# Patient Record
Sex: MALE | Race: WHITE | NOT HISPANIC OR LATINO | Employment: FULL TIME | URBAN - METROPOLITAN AREA
[De-identification: names, ages, dates, MRNs, and addresses within clinical notes are randomized per-mention and may not be internally consistent; named-entity substitution may affect disease eponyms.]

---

## 2017-05-08 ENCOUNTER — ALLSCRIPTS OFFICE VISIT (OUTPATIENT)
Dept: OTHER | Facility: OTHER | Age: 27
End: 2017-05-08

## 2017-05-08 ENCOUNTER — LAB REQUISITION (OUTPATIENT)
Dept: LAB | Facility: HOSPITAL | Age: 27
End: 2017-05-08
Payer: COMMERCIAL

## 2017-05-08 DIAGNOSIS — Z00.00 ENCOUNTER FOR GENERAL ADULT MEDICAL EXAMINATION WITHOUT ABNORMAL FINDINGS: ICD-10-CM

## 2017-05-08 DIAGNOSIS — Z13.1 ENCOUNTER FOR SCREENING FOR DIABETES MELLITUS: ICD-10-CM

## 2017-05-08 DIAGNOSIS — Z78.9 OTHER SPECIFIED HEALTH STATUS: ICD-10-CM

## 2017-05-08 DIAGNOSIS — Z13.0 ENCOUNTER FOR SCREENING FOR DISEASES OF THE BLOOD AND BLOOD-FORMING ORGANS AND CERTAIN DISORDERS INVOLVING THE IMMUNE MECHANISM: ICD-10-CM

## 2017-05-08 DIAGNOSIS — R79.1 ABNORMAL COAGULATION PROFILE: ICD-10-CM

## 2017-05-08 DIAGNOSIS — R17 JAUNDICE: ICD-10-CM

## 2017-05-08 LAB
ALBUMIN SERPL BCP-MCNC: 4.4 G/DL (ref 3.5–5)
ALP SERPL-CCNC: 63 U/L (ref 46–116)
ALT SERPL W P-5'-P-CCNC: 24 U/L (ref 12–78)
ANION GAP SERPL CALCULATED.3IONS-SCNC: 7 MMOL/L (ref 4–13)
AST SERPL W P-5'-P-CCNC: 15 U/L (ref 5–45)
BASOPHILS # BLD AUTO: 0.03 THOUSANDS/ΜL (ref 0–0.1)
BASOPHILS NFR BLD AUTO: 1 % (ref 0–1)
BILIRUB SERPL-MCNC: 2.21 MG/DL (ref 0.2–1)
BUN SERPL-MCNC: 13 MG/DL (ref 5–25)
CALCIUM SERPL-MCNC: 9.5 MG/DL (ref 8.3–10.1)
CHLORIDE SERPL-SCNC: 105 MMOL/L (ref 100–108)
CO2 SERPL-SCNC: 28 MMOL/L (ref 21–32)
CREAT SERPL-MCNC: 0.84 MG/DL (ref 0.6–1.3)
EOSINOPHIL # BLD AUTO: 0.15 THOUSAND/ΜL (ref 0–0.61)
EOSINOPHIL NFR BLD AUTO: 3 % (ref 0–6)
ERYTHROCYTE [DISTWIDTH] IN BLOOD BY AUTOMATED COUNT: 13.1 % (ref 11.6–15.1)
GFR SERPL CREATININE-BSD FRML MDRD: >60 ML/MIN/1.73SQ M
GLUCOSE P FAST SERPL-MCNC: 84 MG/DL (ref 65–99)
HCT VFR BLD AUTO: 45.3 % (ref 36.5–49.3)
HGB BLD-MCNC: 15.5 G/DL (ref 12–17)
LYMPHOCYTES # BLD AUTO: 2.36 THOUSANDS/ΜL (ref 0.6–4.47)
LYMPHOCYTES NFR BLD AUTO: 44 % (ref 14–44)
MCH RBC QN AUTO: 31.2 PG (ref 26.8–34.3)
MCHC RBC AUTO-ENTMCNC: 34.2 G/DL (ref 31.4–37.4)
MCV RBC AUTO: 91 FL (ref 82–98)
MONOCYTES # BLD AUTO: 0.38 THOUSAND/ΜL (ref 0.17–1.22)
MONOCYTES NFR BLD AUTO: 7 % (ref 4–12)
NEUTROPHILS # BLD AUTO: 2.37 THOUSANDS/ΜL (ref 1.85–7.62)
NEUTS SEG NFR BLD AUTO: 45 % (ref 43–75)
NRBC BLD AUTO-RTO: 0 /100 WBCS
PLATELET # BLD AUTO: 234 THOUSANDS/UL (ref 149–390)
PMV BLD AUTO: 11.8 FL (ref 8.9–12.7)
POTASSIUM SERPL-SCNC: 5.1 MMOL/L (ref 3.5–5.3)
PROT SERPL-MCNC: 7.9 G/DL (ref 6.4–8.2)
RBC # BLD AUTO: 4.97 MILLION/UL (ref 3.88–5.62)
SODIUM SERPL-SCNC: 140 MMOL/L (ref 136–145)
VIT B12 SERPL-MCNC: 483 PG/ML (ref 100–900)
WBC # BLD AUTO: 5.31 THOUSAND/UL (ref 4.31–10.16)

## 2017-05-08 PROCEDURE — 80053 COMPREHEN METABOLIC PANEL: CPT | Performed by: FAMILY MEDICINE

## 2017-05-08 PROCEDURE — 85025 COMPLETE CBC W/AUTO DIFF WBC: CPT | Performed by: FAMILY MEDICINE

## 2017-05-08 PROCEDURE — 82607 VITAMIN B-12: CPT | Performed by: FAMILY MEDICINE

## 2017-05-16 ENCOUNTER — APPOINTMENT (OUTPATIENT)
Dept: LAB | Facility: HOSPITAL | Age: 27
End: 2017-05-16
Payer: COMMERCIAL

## 2017-05-16 DIAGNOSIS — Z00.00 ENCOUNTER FOR GENERAL ADULT MEDICAL EXAMINATION WITHOUT ABNORMAL FINDINGS: ICD-10-CM

## 2017-05-16 DIAGNOSIS — Z13.0 ENCOUNTER FOR SCREENING FOR DISEASES OF THE BLOOD AND BLOOD-FORMING ORGANS AND CERTAIN DISORDERS INVOLVING THE IMMUNE MECHANISM: ICD-10-CM

## 2017-05-16 DIAGNOSIS — R79.1 ABNORMAL COAGULATION PROFILE: ICD-10-CM

## 2017-05-16 DIAGNOSIS — Z13.1 ENCOUNTER FOR SCREENING FOR DIABETES MELLITUS: ICD-10-CM

## 2017-05-16 DIAGNOSIS — Z78.9 OTHER SPECIFIED HEALTH STATUS: ICD-10-CM

## 2017-05-16 LAB
FERRITIN SERPL-MCNC: 53 NG/ML (ref 8–388)
IRON SATN MFR SERPL: 28 %
IRON SERPL-MCNC: 103 UG/DL (ref 65–175)
TIBC SERPL-MCNC: 370 UG/DL (ref 250–450)

## 2017-05-16 PROCEDURE — 83550 IRON BINDING TEST: CPT

## 2017-05-16 PROCEDURE — 36415 COLL VENOUS BLD VENIPUNCTURE: CPT

## 2017-05-16 PROCEDURE — 83540 ASSAY OF IRON: CPT

## 2017-05-16 PROCEDURE — 82728 ASSAY OF FERRITIN: CPT

## 2017-06-23 ENCOUNTER — APPOINTMENT (OUTPATIENT)
Dept: LAB | Facility: HOSPITAL | Age: 27
End: 2017-06-23
Payer: COMMERCIAL

## 2017-06-23 ENCOUNTER — TRANSCRIBE ORDERS (OUTPATIENT)
Dept: LAB | Facility: HOSPITAL | Age: 27
End: 2017-06-23

## 2017-06-23 DIAGNOSIS — R17 JAUNDICE: ICD-10-CM

## 2017-06-23 DIAGNOSIS — Z00.8 HEALTH EXAMINATION IN POPULATION SURVEY: Primary | ICD-10-CM

## 2017-06-23 DIAGNOSIS — Z00.8 HEALTH EXAMINATION IN POPULATION SURVEY: ICD-10-CM

## 2017-06-23 LAB
BILIRUB DIRECT SERPL-MCNC: 0.38 MG/DL (ref 0–0.2)
BILIRUB SERPL-MCNC: 2.09 MG/DL (ref 0.2–1)
CHOLEST SERPL-MCNC: 148 MG/DL (ref 50–200)
EST. AVERAGE GLUCOSE BLD GHB EST-MCNC: 97 MG/DL
HBA1C MFR BLD: 5 % (ref 4.2–6.3)
HDLC SERPL-MCNC: 54 MG/DL (ref 40–60)
LDLC SERPL CALC-MCNC: 78 MG/DL (ref 0–100)
TRIGL SERPL-MCNC: 81 MG/DL

## 2017-06-23 PROCEDURE — 80061 LIPID PANEL: CPT

## 2017-06-23 PROCEDURE — 82248 BILIRUBIN DIRECT: CPT

## 2017-06-23 PROCEDURE — 83036 HEMOGLOBIN GLYCOSYLATED A1C: CPT

## 2017-06-23 PROCEDURE — 82247 BILIRUBIN TOTAL: CPT

## 2017-06-23 PROCEDURE — 36415 COLL VENOUS BLD VENIPUNCTURE: CPT

## 2017-06-24 ENCOUNTER — GENERIC CONVERSION - ENCOUNTER (OUTPATIENT)
Dept: OTHER | Facility: OTHER | Age: 27
End: 2017-06-24

## 2017-11-02 ENCOUNTER — OFFICE VISIT (OUTPATIENT)
Dept: URGENT CARE | Age: 27
End: 2017-11-02
Payer: COMMERCIAL

## 2017-11-02 PROCEDURE — S9088 SERVICES PROVIDED IN URGENT: HCPCS | Performed by: FAMILY MEDICINE

## 2017-11-02 PROCEDURE — 99213 OFFICE O/P EST LOW 20 MIN: CPT | Performed by: FAMILY MEDICINE

## 2017-11-03 NOTE — PROGRESS NOTES
Assessment  1  Cephalgia (784 0) (R51)    Plan  Cephalgia    · EC-Naprosyn 500 MG Oral Tablet Delayed Release; TAKE 1 TABLET EVERY 12  HOURS AS NEEDED    Discussion/Summary  Discussion Summary:   Rest, limit activity  every 12 hours for headache (please take with food)  follow-up with family physician  [phone]  go to the hospital emergency department if worse  Medication Side Effects Reviewed: Possible side effects of new medications were reviewed with the patient/guardian today  Understands and agrees with treatment plan: The treatment plan was reviewed with the patient/guardian  The patient/guardian understands and agrees with the treatment plan   Counseling Documentation With Imm: The patient was counseled regarding  Chief Complaint  1  Headache  Chief Complaint Free Text Note Form: c/o headache to b/l temples x 3 days, denies nausea and vomiting, with light sensitivity  with use of excedrine prn  History of Present Illness  HPI: bilateral temporal headache for the past 3 days - no recent illness or injury; no nausea/vomiting; patient does have light sensitivity; history of headaches in the past   Hospital Based Practices Required Assessment:   Pain Assessment   the patient states they have pain  (on a scale of 0 to 10, the patient rates the pain at 4 )   Abuse And Domestic Violence Screen    Yes, the patient is safe at home  -- The patient states no one is hurting them  Depression And Suicide Screen  No, the patient has not had thoughts of hurting themself  No, the patient has not felt depressed in the past 7 days  Prefered Language is  english  Review of Systems  Focused-Male:   Constitutional: as noted in HPI    ENT: no complaints of earache, no loss of hearing, no nosebleeds or nasal discharge, no sore throat or hoarseness  Cardiovascular: no complaints of slow or fast heart rate, no chest pain, no palpitations, no leg claudication or lower extremity edema  Respiratory: no complaints of shortness of breath, no wheezing or cough, no dyspnea on exertion, no orthopnea or PND  Gastrointestinal: no complaints of abdominal pain, no constipation, no nausea or vomiting, no diarrhea or bloody stools  Genitourinary: no complaints of dysuria or incontinence, no hesitancy, no nocturia, no genital lesion, no inadequacy of penile erection  Musculoskeletal: no complaints of arthralgia, no myalgia, no joint swelling or stiffness, no limb pain or swelling  Integumentary: no complaints of skin rash or lesion, no itching or dry skin, no skin wounds  Neurological: headache, but-- as noted in HPI  ROS Reviewed:   ROS reviewed  Active Problems  1  Elevated bilirubin (277 4) (R17)   2  Preventative health care (V70 0) (Z00 00)   3  Prolonged bleeding time (790 92) (R79 1)   4  Screening for diabetes mellitus (DM) (V77 1) (Z13 1)   5  Screening for iron deficiency anemia (V78 0) (Z13 0)   6  Vegetarian (V49 89) (Z78 9)    Past Medical History  Active Problems And Past Medical History Reviewed: The active problems and past medical history were reviewed and updated today  Family History  Mother    1  No pertinent family history  Father    2  No pertinent family history  Family History Reviewed: The family history was reviewed and updated today  Social History   · Always uses seat belt   · Feels safe at home   · Never a smoker   · Social alcohol use (Z78 9)   · Vegetarian (V49 89) (Z78 9)  Social History Reviewed: The social history was reviewed and updated today  The social history was reviewed and is unchanged  Surgical History  Surgical History Reviewed: The surgical history was reviewed and updated today  Current Meds   1  Allegra Allergy 180 MG Oral Tablet; take 1 tablet every day; Therapy: [id number] to Recorded  Medication List Reviewed: The medication list was reviewed and updated today  Allergies  1   No Known Drug Allergies    Vitals  Signs   Recorded: 63KXN4762 08:34AM   Temperature: 98 F, Temporal  Heart Rate: 65  Respiration: 18  Systolic: 313  Diastolic: 78  Height: 5 ft 9 in  Weight: 168 lb   BMI Calculated: 24 81  BSA Calculated: 1 92  O2 Saturation: 99  Pain Scale: 4    Physical Exam    Constitutional   General appearance: No acute distress, well appearing and well nourished  Eyes EOMI, PERRL  Ears, Nose, Mouth, and Throat   External inspection of ears and nose: Normal     Otoscopic examination: Tympanic membrance translucent with normal light reflex  Canals patent without erythema  Nasal mucosa, septum, and turbinates: Normal without edema or erythema  Oropharynx: Normal with no erythema, edema, exudate or lesions  Pulmonary   Respiratory effort: No increased work of breathing or signs of respiratory distress  Auscultation of lungs: Clear to auscultation  Cardiovascular   Auscultation of heart: Normal rate and rhythm, normal S1 and S2, without murmurs  Musculoskeletal tenderness over the temporal areas bilaterally  Skin good color and turgor  Neurologic grossly intact, no nuchal rigidity     Psychiatric   Orientation to person, place and time: Normal     Mood and affect: Normal        Message  Return to work or school:    He is able to return to work on  11/03/2017            Signatures   Electronically signed by : Alberto Johnson DO; Nov 2 2017  8:52AM EST                       (Author)

## 2017-11-06 ENCOUNTER — ALLSCRIPTS OFFICE VISIT (OUTPATIENT)
Dept: OTHER | Facility: OTHER | Age: 27
End: 2017-11-06

## 2017-11-07 NOTE — PROGRESS NOTES
Assessment  1  Atypical migraine (346 80) (G43 009)    Plan  Atypical migraine    · Magnesium Oxide 500 MG Oral Tablet; TAKE 1 TABLET DAILY   · PredniSONE 10 MG Oral Tablet; 6 x1 day 5x 1 day 4 x 2 days 3 x 2 days  2 x 2 days 1x 2 days   · SUMAtriptan Succinate 50 MG Oral Tablet; TAKE 1 TABLET FOR MIGRAINE RELIEF   MAY REPEAT EVERY 2 HOURS  MAX 200MG/DAY    Discussion/Summary    Patient is a 44-year-old maleatypical migraine headache - reviewed patient's headaches with her  He appears neurovascularly stable  He states that he has been having these the headaches for years  He states that they may be related to his diet  At this time, he states that he has been having these headaches for the past week  He has been taking NSAIDs with minimal relief  He was advised that his headaches may likely multifactorial  His benefit from using magnesium oxide 500 mg daily  secondary to his current headache start treatment with prednisone for taper  He was also given a prescription for sumatriptan to take p r n  for symptom relief  Follow up if any symptoms are persisting  Chief Complaint  pt here for a f/u to the urgent care when he was seen for a migraine states that it is still lingering  History of Present Illness  The patient is being seen for an initial evaluation of migraine headaches  Symptoms:  photophobia-- and-- phonophobia, but-- no vomiting--    The patient presents with complaints of gradual onset of constant episodes of mild left > right headache, non-radiating  On a scale of 1 to 10, the patient rates the pain as 5  Episodes started 1 week ago  Symptoms are not improved by NSAIDs  Symptoms are made worse by noise and light, but not by fatigue, stress, caffeine withdrawal, foods and alcohol  The patient is currently experiencing symptoms  Pain scores include a current pain level of 4/10  Current treatment includes nonsteroidal anti-inflammatory drugs and caffeine  By report there is fair symptom control  Review of Systems    Constitutional: not feeling poorly-- and-- not feeling tired  Eyes: no eyesight problems-- and-- no purulent discharge from the eyes  ENT: no nosebleeds-- and-- no nasal discharge  Cardiovascular: no chest pain-- and-- no palpitations  Respiratory: no shortness of breath-- and-- no cough  Gastrointestinal: no abdominal pain,-- no nausea-- and-- no diarrhea  Genitourinary: no dysuria-- and-- no nocturia  Musculoskeletal: no arthralgias-- and-- no myalgias  Neurological: no numbness-- and-- no dizziness  Psychiatric: no anxiety-- and-- no depression  Endocrine: no muscle weakness-- and-- no erectile dysfunction  Hematologic/Lymphatic: no tendency for easy bleeding-- and-- no tendency for easy bruising  Active Problems  1  Cephalgia (784 0) (R51)   2  Elevated bilirubin (277 4) (R17)   3  Preventative health care (V70 0) (Z00 00)   4  Prolonged bleeding time (790 92) (R79 1)   5  Screening for diabetes mellitus (DM) (V77 1) (Z13 1)   6  Screening for iron deficiency anemia (V78 0) (Z13 0)   7  Vegetarian (V49 89) (Z78 9)    Past Medical History    The active problems and past medical history were reviewed and updated today  Surgical History    The surgical history was reviewed and updated today  Family History    The family history was reviewed and updated today  Social History   · Always uses seat belt   · Feels safe at home   · Never a smoker   · Social alcohol use (Z78 9)   · Vegetarian (V49 89) (Z78 9)  The social history was reviewed and updated today  The social history was reviewed and is unchanged  Current Meds   1  Allegra Allergy 180 MG Oral Tablet; take 1 tablet every day; Therapy: [id number] to Recorded   2  EC-Naprosyn 500 MG Oral Tablet Delayed Release; TAKE 1 TABLET EVERY 12 HOURS   AS NEEDED;    Therapy: [id number] to (Complete:12Nov2017)  Requested for: [id number]; Last   Rx:02Nov2017 Ordered    The medication list was reviewed and updated today  Allergies  1  No Known Drug Allergies    Vitals  Vital Signs    Recorded: 61VJO2962 08:37AM   Temperature 97 7 F, Tympanic   Heart Rate 78   Pulse Quality Normal   Respiration Quality Normal   Respiration 17   Systolic 781, LUE, Sitting   Diastolic 70, LUE, Sitting   Height 5 ft 11 in   Weight 174 lb 3 oz   BMI Calculated 24 29   BSA Calculated 1 99   O2 Saturation 98   Pain Scale 4     Physical Exam    Constitutional   General appearance: No acute distress, well appearing and well nourished  Eyes   Conjunctiva and lids: No swelling, erythema, or discharge  Pupils and irises: Equal, round and reactive to light  Ears, Nose, Mouth, and Throat   External inspection of ears and nose: Normal     Nasal mucosa, septum, and turbinates: Normal without edema or erythema  Oropharynx: Normal with no erythema, edema, exudate or lesions  Pulmonary   Respiratory effort: No increased work of breathing or signs of respiratory distress  Auscultation of lungs: Clear to auscultation, equal breath sounds bilaterally, no wheezes, no rales, no rhonci  Cardiovascular   Auscultation of heart: Normal rate and rhythm, normal S1 and S2, without murmurs  Examination of extremities for edema and/or varicosities: Normal     Abdomen   Abdomen: Non-tender, no masses  Liver and spleen: No hepatomegaly or splenomegaly  Lymphatic   Palpation of lymph nodes in neck: No lymphadenopathy  Musculoskeletal   Gait and station: Normal     Inspection/palpation of joints, bones, and muscles: Normal     Skin   Skin and subcutaneous tissue: Normal without rashes or lesions  Neurologic   Cranial nerves: Cranial nerves 2-12 intact  Sensation: No sensory loss  Psychiatric   Orientation to person, place and time: Normal     Mood and affect: Normal          Signatures   Electronically signed by :  Neela Abad DO; Nov 6 2017  9:02AM EST                       (Author)

## 2018-01-11 NOTE — RESULT NOTES
Verified Results  (1) BILIRUBIN, DIRECT [id number] 11:59AM [employer] Order Number: [id number]     Test Name Result Flag Reference   BILI, DIRECT 0 38 mg/dL H 0 00-0 20     (1) BILIRUBIN, TOTAL 95SLP4886 11:59AM [employer] Order Number: [id number]     Test Name Result Flag Reference   BILI, TOTAL 2 09 mg/dL H 0 20-1 00

## 2018-01-13 VITALS
RESPIRATION RATE: 17 BRPM | BODY MASS INDEX: 24.97 KG/M2 | HEART RATE: 69 BPM | TEMPERATURE: 98 F | DIASTOLIC BLOOD PRESSURE: 78 MMHG | OXYGEN SATURATION: 98 % | WEIGHT: 168.56 LBS | HEIGHT: 69 IN | SYSTOLIC BLOOD PRESSURE: 120 MMHG

## 2018-01-14 VITALS
HEART RATE: 78 BPM | SYSTOLIC BLOOD PRESSURE: 112 MMHG | BODY MASS INDEX: 24.39 KG/M2 | WEIGHT: 174.19 LBS | DIASTOLIC BLOOD PRESSURE: 70 MMHG | TEMPERATURE: 97.7 F | RESPIRATION RATE: 17 BRPM | OXYGEN SATURATION: 98 % | HEIGHT: 71 IN

## 2018-01-18 NOTE — MISCELLANEOUS
Message  Return to work or school:    He is able to return to work on  11/03/2017            Signatures   Electronically signed by : Hamida Gould DO; Nov 2 2017  8:52AM EST                       (Author)

## 2018-01-22 ENCOUNTER — ALLSCRIPTS OFFICE VISIT (OUTPATIENT)
Dept: OTHER | Facility: OTHER | Age: 28
End: 2018-01-22

## 2018-01-25 ENCOUNTER — OFFICE VISIT (OUTPATIENT)
Dept: FAMILY MEDICINE CLINIC | Facility: CLINIC | Age: 28
End: 2018-01-25
Payer: COMMERCIAL

## 2018-01-25 VITALS
TEMPERATURE: 97.3 F | HEART RATE: 68 BPM | DIASTOLIC BLOOD PRESSURE: 76 MMHG | SYSTOLIC BLOOD PRESSURE: 130 MMHG | WEIGHT: 170.1 LBS | BODY MASS INDEX: 23.72 KG/M2 | OXYGEN SATURATION: 98 %

## 2018-01-25 DIAGNOSIS — R00.2 PALPITATION: ICD-10-CM

## 2018-01-25 DIAGNOSIS — R07.89 CHEST DISCOMFORT: Primary | ICD-10-CM

## 2018-01-25 PROBLEM — M25.511 RIGHT SHOULDER PAIN: Status: ACTIVE | Noted: 2018-01-22

## 2018-01-25 PROBLEM — G43.009 ATYPICAL MIGRAINE: Status: ACTIVE | Noted: 2017-11-06

## 2018-01-25 PROCEDURE — 36415 COLL VENOUS BLD VENIPUNCTURE: CPT | Performed by: FAMILY MEDICINE

## 2018-01-25 PROCEDURE — 99213 OFFICE O/P EST LOW 20 MIN: CPT | Performed by: FAMILY MEDICINE

## 2018-01-25 PROCEDURE — 80053 COMPREHEN METABOLIC PANEL: CPT | Performed by: FAMILY MEDICINE

## 2018-01-25 PROCEDURE — 93000 ELECTROCARDIOGRAM COMPLETE: CPT | Performed by: FAMILY MEDICINE

## 2018-01-25 PROCEDURE — 84443 ASSAY THYROID STIM HORMONE: CPT | Performed by: FAMILY MEDICINE

## 2018-01-25 RX ORDER — FEXOFENADINE HCL 180 MG/1
1 TABLET ORAL AS NEEDED
COMMUNITY
Start: 2017-05-08

## 2018-01-25 RX ORDER — SUMATRIPTAN 50 MG/1
1 TABLET, FILM COATED ORAL AS NEEDED
COMMUNITY
Start: 2017-11-06 | End: 2021-03-03

## 2018-01-25 NOTE — PROGRESS NOTES
Assessment/Plan:  Patient is a 53-year-old male     1  Palpitations -  Patient appears clinically and hemodynamically stable today  He did show NSR/no ST or T-wave changes  He was advised on the different possible causes for his symptoms  At this time, check  CMP as well as TSH  Avoid caffeine at this time  He was advised to monitor his symptoms closely as well as his blood pressure  If he is noticing elevations, consider checking Holter monitor  Diagnoses and all orders for this visit:    Chest discomfort  -     POCT ECG    Palpitation    Other orders  -     SUMAtriptan (IMITREX) 50 mg tablet; Take 1 tablet by mouth  -     Magnesium Oxide 500 MG (LAX) TABS; Take 1 tablet by mouth daily  -     fexofenadine (ALLEGRA) 180 MG tablet; Take 1 tablet by mouth daily          Subjective:      Patient ID: Samir Velazquez is a 24 y o  male  Palpitations   This is a new problem  The current episode started yesterday  The problem occurs intermittently  The problem has been unchanged  Associated symptoms include chest pain  Pertinent negatives include no abdominal pain, anorexia, arthralgias, chills, congestion, coughing, fatigue, fever, headaches, joint swelling, myalgias, nausea, numbness, sore throat or vertigo  The symptoms are aggravated by standing  He has tried nothing for the symptoms  The treatment provided mild relief  The following portions of the patient's history were reviewed and updated as appropriate: allergies, current medications, past family history, past medical history, past social history, past surgical history and problem list     Review of Systems   Constitutional: Negative for activity change, chills, fatigue and fever  HENT: Negative for congestion, ear pain, sinus pressure and sore throat  Eyes: Negative for redness, itching and visual disturbance  Respiratory: Negative for cough and shortness of breath  Cardiovascular: Positive for chest pain  Negative for palpitations  Gastrointestinal: Negative for abdominal pain, anorexia, diarrhea and nausea  Endocrine: Negative for cold intolerance and heat intolerance  Genitourinary: Negative for dysuria, flank pain and frequency  Musculoskeletal: Negative for arthralgias, back pain, gait problem, joint swelling and myalgias  Skin: Negative for color change  Allergic/Immunologic: Negative for environmental allergies  Neurological: Negative for dizziness, vertigo, numbness and headaches  Psychiatric/Behavioral: Negative for behavioral problems and sleep disturbance  Objective:     Physical Exam   Constitutional: He is oriented to person, place, and time  He appears well-developed and well-nourished  HENT:   Head: Normocephalic and atraumatic  Nose: Nose normal    Mouth/Throat: No oropharyngeal exudate  Eyes: Pupils are equal, round, and reactive to light  Right eye exhibits no discharge  Left eye exhibits no discharge  Neck: Normal range of motion  Neck supple  No tracheal deviation present  Cardiovascular: Normal rate, regular rhythm and intact distal pulses  Exam reveals no gallop and no friction rub  No murmur heard  Pulses:       Dorsalis pedis pulses are 2+ on the right side, and 2+ on the left side  Posterior tibial pulses are 2+ on the right side, and 2+ on the left side  Pulmonary/Chest: No respiratory distress  He has no wheezes  He has no rales  Abdominal: He exhibits no distension  There is no tenderness  There is no rebound and no guarding  Musculoskeletal: Normal range of motion  He exhibits no edema  Lymphadenopathy:        Head (right side): No submental and no submandibular adenopathy present  Head (left side): No submental and no submandibular adenopathy present  He has no cervical adenopathy  Right cervical: No superficial cervical, no deep cervical and no posterior cervical adenopathy present         Left cervical: No superficial cervical, no deep cervical and no posterior cervical adenopathy present  Neurological: He is alert and oriented to person, place, and time  No cranial nerve deficit or sensory deficit  Skin: Skin is warm, dry and intact  Psychiatric: His speech is normal and behavior is normal  Judgment normal  His mood appears not anxious  Cognition and memory are normal  He does not exhibit a depressed mood

## 2018-01-26 LAB
ALBUMIN SERPL BCP-MCNC: 4.5 G/DL (ref 3.5–5)
ALP SERPL-CCNC: 64 U/L (ref 46–116)
ALT SERPL W P-5'-P-CCNC: 19 U/L (ref 12–78)
ANION GAP SERPL CALCULATED.3IONS-SCNC: 4 MMOL/L (ref 4–13)
AST SERPL W P-5'-P-CCNC: 14 U/L (ref 5–45)
BILIRUB SERPL-MCNC: 2.11 MG/DL (ref 0.2–1)
BUN SERPL-MCNC: 14 MG/DL (ref 5–25)
CALCIUM SERPL-MCNC: 9.1 MG/DL (ref 8.3–10.1)
CHLORIDE SERPL-SCNC: 106 MMOL/L (ref 100–108)
CO2 SERPL-SCNC: 30 MMOL/L (ref 21–32)
CREAT SERPL-MCNC: 0.8 MG/DL (ref 0.6–1.3)
GFR SERPL CREATININE-BSD FRML MDRD: 124 ML/MIN/1.73SQ M
GLUCOSE SERPL-MCNC: 77 MG/DL (ref 65–140)
POTASSIUM SERPL-SCNC: 4.4 MMOL/L (ref 3.5–5.3)
PROT SERPL-MCNC: 7.8 G/DL (ref 6.4–8.2)
SODIUM SERPL-SCNC: 140 MMOL/L (ref 136–145)
TSH SERPL DL<=0.05 MIU/L-ACNC: 0.78 UIU/ML (ref 0.36–3.74)

## 2018-04-20 ENCOUNTER — TELEPHONE (OUTPATIENT)
Dept: FAMILY MEDICINE CLINIC | Facility: CLINIC | Age: 28
End: 2018-04-20

## 2018-04-20 NOTE — TELEPHONE ENCOUNTER
----- Message from Chacho Etienne sent at 4/19/2018  8:20 PM EDT -----  Regarding: Referral Request  Contact: [phone]    Yongy the Doc! It's Td Patel  I have been having this awkward knee discomfort/mild pain in the outside of my right knee for the past few weeks that I can't seem to shake  Resting, stretching, nothing works! I run 7 miles and it doesn't get any better or worse throughout exercising  Odd that's it's just persistent  It is possible for you to refer me to a PT to get this checked out? Or would this be something I click up with you first     Thanks for you time!     Td Patel

## 2018-04-23 ENCOUNTER — APPOINTMENT (OUTPATIENT)
Dept: RADIOLOGY | Facility: MEDICAL CENTER | Age: 28
End: 2018-04-23
Payer: COMMERCIAL

## 2018-04-23 ENCOUNTER — OFFICE VISIT (OUTPATIENT)
Dept: FAMILY MEDICINE CLINIC | Facility: CLINIC | Age: 28
End: 2018-04-23
Payer: COMMERCIAL

## 2018-04-23 ENCOUNTER — TRANSCRIBE ORDERS (OUTPATIENT)
Dept: ADMINISTRATIVE | Facility: HOSPITAL | Age: 28
End: 2018-04-23

## 2018-04-23 VITALS
DIASTOLIC BLOOD PRESSURE: 80 MMHG | WEIGHT: 173.56 LBS | BODY MASS INDEX: 24.85 KG/M2 | TEMPERATURE: 96.7 F | HEIGHT: 70 IN | SYSTOLIC BLOOD PRESSURE: 140 MMHG | RESPIRATION RATE: 18 BRPM | OXYGEN SATURATION: 98 % | HEART RATE: 65 BPM

## 2018-04-23 DIAGNOSIS — M25.561 ACUTE PAIN OF RIGHT KNEE: ICD-10-CM

## 2018-04-23 DIAGNOSIS — M25.561 ACUTE PAIN OF RIGHT KNEE: Primary | ICD-10-CM

## 2018-04-23 PROCEDURE — 73564 X-RAY EXAM KNEE 4 OR MORE: CPT

## 2018-04-23 PROCEDURE — 99213 OFFICE O/P EST LOW 20 MIN: CPT | Performed by: FAMILY MEDICINE

## 2018-04-23 NOTE — PROGRESS NOTES
Assessment/Plan:   1  Acute pain of right knee  Symptoms today appear likely secondary to acute knee strain  His testing was largely negative  At this time, check x-ray to rule out gross abnormalities  He may benefit greatly from seeing a physical therapist as he has a avid runner  He may ice knee for 10-15 minutes multiple times a day  Elevate at nighttime  Take his anti-inflammatories of ibuprofen 600 milligrams q 6 hours for the next 2 days straight  Then take as needed  Will follow up if any symptoms are worsening   - Ambulatory referral to Physical Therapy; Future  - XR knee 4+ vw right injury; Future     There are no diagnoses linked to this encounter  Subjective:    Chief Complaint   Patient presents with    R Knee Pain     persistent x 3 week  Has taken Advil and OTC medication with little relief        Patient ID: Ming Thomas is a 24 y o  male  Knee Pain    Incident onset: Three weeks ago  There was no injury mechanism  The pain is present in the right knee  The quality of the pain is described as aching  The pain is at a severity of 3/10  The pain is mild  The pain has been intermittent since onset  Pertinent negatives include no inability to bear weight, loss of motion, loss of sensation, muscle weakness, numbness or tingling  Associated symptoms comments: Negative for locking or giving out    Exacerbated by: Worsened by running  He has tried NSAIDs for the symptoms  The treatment provided mild relief  Review of Systems   Constitutional: Negative for activity change, chills, fatigue and fever  HENT: Negative for congestion, ear pain, sinus pressure and sore throat  Eyes: Negative for redness, itching and visual disturbance  Respiratory: Negative for cough and shortness of breath  Cardiovascular: Negative for chest pain and palpitations  Gastrointestinal: Negative for abdominal pain, diarrhea and nausea  Endocrine: Negative for cold intolerance and heat intolerance  Genitourinary: Negative for dysuria, flank pain and frequency  Musculoskeletal: Negative for arthralgias, back pain, gait problem and myalgias  Skin: Negative for color change  Allergic/Immunologic: Negative for environmental allergies  Neurological: Negative for dizziness, tingling, numbness and headaches  Psychiatric/Behavioral: Negative for behavioral problems and sleep disturbance  The following portions of the patient's history were reviewed and updated as appropriate : past family history, past medical history, past social history and past surgical history  Objective:    Vitals:    04/23/18 0851   BP: 140/80   BP Location: Left arm   Patient Position: Sitting   Cuff Size: Adult   Pulse: 65   Resp: 18   Temp: (!) 96 7 °F (35 9 °C)   TempSrc: Tympanic   SpO2: 98%   Weight: 78 7 kg (173 lb 9 oz)   Height: 5' 10" (1 778 m)        Physical Exam   Constitutional: He is oriented to person, place, and time  He appears well-developed and well-nourished  Musculoskeletal:        Right knee: He exhibits bony tenderness  He exhibits normal range of motion, no swelling, no effusion, no deformity, no erythema, no LCL laxity and no MCL laxity  Tenderness found  No medial joint line, no lateral joint line, no MCL, no LCL and no patellar tendon tenderness noted  Neurological: He is alert and oriented to person, place, and time  No cranial nerve deficit  Skin: Skin is warm and dry

## 2018-05-01 ENCOUNTER — OFFICE VISIT (OUTPATIENT)
Dept: FAMILY MEDICINE CLINIC | Facility: CLINIC | Age: 28
End: 2018-05-01
Payer: COMMERCIAL

## 2018-05-01 VITALS
TEMPERATURE: 97.3 F | WEIGHT: 176.7 LBS | BODY MASS INDEX: 25.3 KG/M2 | OXYGEN SATURATION: 98 % | RESPIRATION RATE: 18 BRPM | HEIGHT: 70 IN | DIASTOLIC BLOOD PRESSURE: 88 MMHG | HEART RATE: 64 BPM | SYSTOLIC BLOOD PRESSURE: 136 MMHG

## 2018-05-01 DIAGNOSIS — R07.89 CHEST DISCOMFORT: Primary | ICD-10-CM

## 2018-05-01 DIAGNOSIS — I10 BENIGN ESSENTIAL HYPERTENSION: ICD-10-CM

## 2018-05-01 PROCEDURE — 3075F SYST BP GE 130 - 139MM HG: CPT | Performed by: FAMILY MEDICINE

## 2018-05-01 PROCEDURE — 3079F DIAST BP 80-89 MM HG: CPT | Performed by: FAMILY MEDICINE

## 2018-05-01 PROCEDURE — 99214 OFFICE O/P EST MOD 30 MIN: CPT | Performed by: FAMILY MEDICINE

## 2018-05-01 RX ORDER — AMLODIPINE BESYLATE 2.5 MG/1
5 TABLET ORAL DAILY
Qty: 30 TABLET | Refills: 2 | Status: SHIPPED | OUTPATIENT
Start: 2018-05-01 | End: 2018-07-19 | Stop reason: SDUPTHER

## 2018-05-01 NOTE — PROGRESS NOTES
Assessment/Plan:   1  Chest discomfort/Benign essential hypertension  Reviewed patient's symptoms today  At this time, he currently does not have any risk factors  His EKG appeared to show normal sinus rhythm  Will send him for a exercise stress test   He was advised to continue with routine home monitoring of his blood pressure  Will start treatment with amlodipine 2 5 milligrams daily  If any symptoms should worsen, he was advised to follow up immediately  - amLODIPine (NORVASC) 2 5 mg tablet; Take 1 tablet by mouth daily  Dispense: 30 tablet; Refill: 2  - Stress test only, exercise; Future     There are no diagnoses linked to this encounter  Subjective:    Chief Complaint   Patient presents with    Follow-up     bp check         Patient ID: Baltazar Walter is a 24 y o  male  Patient is a 51-year-old presents today with a CC of chest pain and elevated blood pressure readings  He states that he has been having left-sided chest pain  This pain has been described as an aching pain  He has noticed this that random times  He states the pain does radiate into his neck  He denies any palpitations, lightheadedness, shortness of breath  He notes that his blood pressure has been mildly elevated  He does feel his heart beating at times of his chest   Denies headaches, nosebleeds  He has not taken anything for symptoms  Patient does exercise regularly and follow a very strict diet plan  He denies any caffeine use or energy drink use  Review of Systems   Constitutional: Negative for activity change, chills, fatigue and fever  HENT: Negative for congestion, ear pain, sinus pressure and sore throat  Eyes: Negative for redness, itching and visual disturbance  Respiratory: Negative for cough and shortness of breath  Cardiovascular: Negative for chest pain and palpitations  Gastrointestinal: Negative for abdominal pain, diarrhea and nausea     Endocrine: Negative for cold intolerance and heat intolerance  Genitourinary: Negative for dysuria, flank pain and frequency  Musculoskeletal: Negative for arthralgias, back pain, gait problem and myalgias  Skin: Negative for color change  Allergic/Immunologic: Negative for environmental allergies  Neurological: Negative for dizziness, numbness and headaches  Psychiatric/Behavioral: Negative for behavioral problems and sleep disturbance  The following portions of the patient's history were reviewed and updated as appropriate : past family history, past medical history, past social history and past surgical history  Objective:    Vitals:    05/01/18 1141   BP: 136/88   Pulse: 64   Resp: 18   Temp: (!) 97 3 °F (36 3 °C)   TempSrc: Tympanic   SpO2: 98%   Weight: 80 2 kg (176 lb 11 2 oz)   Height: 5' 10" (1 778 m)        Physical Exam   Constitutional: He is oriented to person, place, and time  He appears well-developed and well-nourished  HENT:   Head: Normocephalic and atraumatic  Nose: Nose normal    Mouth/Throat: No oropharyngeal exudate  Eyes: Pupils are equal, round, and reactive to light  Right eye exhibits no discharge  Left eye exhibits no discharge  Neck: Normal range of motion  Neck supple  No tracheal deviation present  Cardiovascular: Normal rate, regular rhythm and intact distal pulses  Exam reveals no gallop and no friction rub  No murmur heard  Pulses:       Dorsalis pedis pulses are 2+ on the right side, and 2+ on the left side  Posterior tibial pulses are 2+ on the right side, and 2+ on the left side  Pulmonary/Chest: Effort normal and breath sounds normal  No respiratory distress  He has no wheezes  He has no rales  Abdominal: Soft  Bowel sounds are normal  He exhibits no distension  There is no tenderness  There is no rebound and no guarding  Musculoskeletal: Normal range of motion  He exhibits no edema     Lymphadenopathy:        Head (right side): No submental and no submandibular adenopathy present  Head (left side): No submental and no submandibular adenopathy present  He has no cervical adenopathy  Right cervical: No superficial cervical, no deep cervical and no posterior cervical adenopathy present  Left cervical: No superficial cervical, no deep cervical and no posterior cervical adenopathy present  Neurological: He is alert and oriented to person, place, and time  No cranial nerve deficit or sensory deficit  Skin: Skin is warm, dry and intact  Psychiatric: His speech is normal and behavior is normal  Judgment normal  His mood appears not anxious  Cognition and memory are normal  He does not exhibit a depressed mood  Vitals reviewed

## 2018-05-02 ENCOUNTER — EVALUATION (OUTPATIENT)
Dept: PHYSICAL THERAPY | Facility: REHABILITATION | Age: 28
End: 2018-05-02
Payer: COMMERCIAL

## 2018-05-02 DIAGNOSIS — M25.561 ACUTE PAIN OF RIGHT KNEE: Primary | ICD-10-CM

## 2018-05-02 PROCEDURE — 97110 THERAPEUTIC EXERCISES: CPT | Performed by: PHYSICAL THERAPIST

## 2018-05-02 PROCEDURE — G8990 OTHER PT/OT CURRENT STATUS: HCPCS | Performed by: PHYSICAL THERAPIST

## 2018-05-02 PROCEDURE — 97161 PT EVAL LOW COMPLEX 20 MIN: CPT | Performed by: PHYSICAL THERAPIST

## 2018-05-02 PROCEDURE — 97140 MANUAL THERAPY 1/> REGIONS: CPT | Performed by: PHYSICAL THERAPIST

## 2018-05-02 PROCEDURE — G8991 OTHER PT/OT GOAL STATUS: HCPCS | Performed by: PHYSICAL THERAPIST

## 2018-05-02 NOTE — PROGRESS NOTES
PT Evaluation     Today's date: 2018  Patient name: Isaias Palmer  : 1990  MRN: [id number]  Referring provider: Aida Zayas DO  Dx:   Encounter Diagnosis     ICD-10-CM    1  Acute pain of right knee M25 561 Ambulatory referral to Physical Therapy       Start Time:   Stop Time: 945  Total time in clinic (min): 55 minutes    Assessment  Impairments: abnormal muscle tone, abnormal or restricted ROM, abnormal movement, impaired balance, lacks appropriate home exercise program and pain with function    Assessment details: Isaias Palmer is a 24 y o  male who presents with complaints of Acute pain of right knee  No further referral appears necessary at this time based upon examination results  He is presenting with knee joint dysfunction and pes planus leading to genu valgum leading to lateral knee pain  Prognosis is good given HEP compliance and PT 2-3x/wk  Positive prognostic indicators include positive attitude toward recovery  Please contact me if you have any questions or recommendations  Thank you for the opportunity to share in  [place]'s care  Understanding of Dx/Px/POC: good   Prognosis: good    Plan  Patient would benefit from: skilled PT  Planned therapy interventions: joint mobilization, manual therapy, strengthening, stretching, therapeutic activities, therapeutic exercise, therapeutic training, graded motor, graded exercise, graded activity, functional ROM exercises, balance and home exercise program  Frequency: 2x week  Duration in weeks: 6  Treatment plan discussed with: patient        Subjective Evaluation    History of Present Illness  Mechanism of injury: Patient reports running about 20-30 miles per week and was running on old shoes waiting for new shoes to get in  Reports deep pain lateral aspect of right knee  Slight improvement with new shoes  No relief with resting nor with stretching  Dull aching discomfort reported with long walking in biceps femoris region   No pain reported with squatting nor ascending stairs but does have pain descending stairs  Patient reports having flat feet  Reports prior history of left ankle pain  Pain  Current pain ratin  At best pain ratin  At worst pain ratin  Quality: dull ache  Aggravating factors: walking and stair climbing    Treatments  Previous treatment: medication  Patient Goals  Patient goal: descend steps without pain and return to running        Objective    Flowsheet Rows      Most Recent Value   PT/OT G-Codes   Current Score  72   Projected Score  84   FOTO information reviewed  Yes   Assessment Type  Evaluation   G code set  Other PT/OT Primary   Other PT Primary Current Status ()  CJ   Other PT Primary Goal Status ()  CI       LE exam  Squat assess: Bilateral genu valgum, RLe pes planus  ¼ squat assess: RLe genu valgum > LLE           MMT    Hip       L       R   Flex  5 5   Extn  4 4   Abd  5 4   Add  5 5   IR  5 5   ER  5 4        G  Max 4- 3+   G  Med  4 3+                 MMT         AROM         PROM    Knee         L        R        L         R         L          R   Flex  5 5 WNL WNL     Extn  5 5 WNL WNL                           MMT    Ankle       L        R   PF 5 5   DF   5 5   EHL 5 5     Straight leg raise:   L= -   R= -         Hip:  scour=  -   lorri =    Fadir=   joint mobility= -      long axis distraction=    Vickys=  -      Osvaldo test=  -          hamstring dominance test=  +   hip abd/lat rot =  +      Knee: post drawer = -  Lachmans test=  -  anterior draw test=  -  Valgus stress test=  -   varus stress test =  -   Sebastian test =  -    Patella grind test=  -   Patella mobility test= -   Thessaly=  +  Appley test= -  joint findings=  Posterior positioned fibula, lateral tibial shift/tibial ER torsion        Precautions: Asthma, HTN, headaches  Daily Treatment Diary     Manual              Gr  5 p/a fib head FB            Lat tib shift correction FB            Sustained hold tibial ER correction FB            RLE navicular sling tape FB                             Exercise Diary  5/2            Long sit SLr flex 2x5, 5"            Clam shell 2x10, 5"            bridge 2x10, 5"            Seated hamstring AROM stretch 5x10"                                                                                                                                                                                                                                Modalities                                                         Short Term:  1  Pt will report decreased levels of pain by at least 2 subjective ratings in 4 weeks  2  Pt will demonstrate improved ROM by at least 10 degrees in 4 weeks  3  Pt will demonstrate improved strength by 1/2 grade  4  Pt will be able to descend steps without pain in 4 weeks  Long Term:   1  Pt will be independent in their HEP in 8 weeks  2  Pt will be be pain free with IADL's  3  Pt will demonstrate improved FOTO, > 12   4  Pt will be able to return to running for exercise in 8 weeks

## 2018-05-09 ENCOUNTER — HOSPITAL ENCOUNTER (OUTPATIENT)
Dept: NON INVASIVE DIAGNOSTICS | Facility: CLINIC | Age: 28
Discharge: HOME | End: 2018-05-09
Payer: COMMERCIAL

## 2018-05-09 DIAGNOSIS — R07.89 CHEST DISCOMFORT: ICD-10-CM

## 2018-05-09 DIAGNOSIS — I10 BENIGN ESSENTIAL HYPERTENSION: ICD-10-CM

## 2018-05-09 PROCEDURE — 93017 CV STRESS TEST TRACING ONLY: CPT

## 2018-05-09 PROCEDURE — 93018 CV STRESS TEST I&R ONLY: CPT | Performed by: INTERNAL MEDICINE

## 2018-05-09 PROCEDURE — 93016 CV STRESS TEST SUPVJ ONLY: CPT | Performed by: INTERNAL MEDICINE

## 2018-05-11 ENCOUNTER — OFFICE VISIT (OUTPATIENT)
Dept: PHYSICAL THERAPY | Facility: REHABILITATION | Age: 28
End: 2018-05-11
Payer: COMMERCIAL

## 2018-05-11 DIAGNOSIS — M25.561 ACUTE PAIN OF RIGHT KNEE: Primary | ICD-10-CM

## 2018-05-11 LAB
ARRHY DURING EX: NORMAL
CHEST PAIN STATEMENT: NORMAL
MAX DIASTOLIC BP: 90 MMHG
MAX HEART RATE: 171 BPM
MAX PREDICTED HEART RATE: 195 BPM
MAX. SYSTOLIC BP: 188 MMHG
PROTOCOL NAME: NORMAL
REASON FOR TERMINATION: NORMAL
TARGET HR FORMULA: NORMAL
TEST INDICATION: NORMAL
TIME IN EXERCISE PHASE: NORMAL

## 2018-05-11 PROCEDURE — 97140 MANUAL THERAPY 1/> REGIONS: CPT

## 2018-05-11 PROCEDURE — 97110 THERAPEUTIC EXERCISES: CPT

## 2018-05-11 NOTE — PROGRESS NOTES
Daily Note     Today's date: 2018  Patient name: Leann De La Torre  : 1990  MRN: [id number]  Referring provider: Christian Galloway DO  Dx:   Encounter Diagnosis     ICD-10-CM    1  Acute pain of right knee M25 561                   Subjective:       Objective: See treatment diary below  Precautions: Asthma, HTN, headaches  Daily Treatment Diary      Manual                     Gr  5 p/a fib head FB  RS                   Lat tib shift correction FB                     Sustained hold tibial ER correction FB                     RLE navicular sling tape FB                      IASTM distal hamstring    HS                         Exercise Diary                     Long sit SLr flex 2x5, 5"  5"  2x10                   Clam shell 2x10, 5"  5" 2x10                   bridge 2x10, 5"  5" 2x10                   Seated hamstring AROM stretch 5x10"  5x10"                    active hamstring strect    2x10                    PBall hamstring curls    2x10                    single leg bridge    2x10                    lunges   2x10                    TB sidestepping   OTB                                                                                                                                                                                                                                                                                                   Modalities                                                                                                    Assessment: Patient reports no increased pain during or post tx  Plan: Continue per plan of care

## 2018-05-14 ENCOUNTER — OFFICE VISIT (OUTPATIENT)
Dept: PHYSICAL THERAPY | Facility: REHABILITATION | Age: 28
End: 2018-05-14
Payer: COMMERCIAL

## 2018-05-14 DIAGNOSIS — M25.561 ACUTE PAIN OF RIGHT KNEE: Primary | ICD-10-CM

## 2018-05-14 PROCEDURE — 97110 THERAPEUTIC EXERCISES: CPT

## 2018-05-14 PROCEDURE — 97140 MANUAL THERAPY 1/> REGIONS: CPT

## 2018-05-14 PROCEDURE — 97112 NEUROMUSCULAR REEDUCATION: CPT

## 2018-05-14 NOTE — PROGRESS NOTES
Daily Note     Today's date: 2018  Patient name: Lupis Shaw  : 1990  MRN: [id number]  Referring provider: Justin Medel DO  Dx:   Encounter Diagnosis     ICD-10-CM    1  Acute pain of right knee M25 561                   Subjective: Patient reports after working out on Saturday and having glute fatigue as well as distal hamstring pain has not changed  Pt reports not having any knee pain at this time  1:1 30 min indep TE remaining      Objective: See treatment diary below  Precautions: Asthma, HTN, headaches  Daily Treatment Diary      Manual                   Gr  5 p/a fib head FB  RS                   Lat tib shift correction FB                     Sustained hold tibial ER correction FB                     RLE navicular sling tape FB                      IASTM distal hamstring    HS                         Exercise Diary                   Long sit SLr flex 2x5, 5"  5"  2x10  5"  2x10                 Clam shell 2x10, 5"  5" 2x10  5"  2x10                 bridge 2x10, 5"  5" 2x10  5"  2x10                 Seated hamstring AROM stretch 5x10"  5x10"  5x10"                  active hamstring strect    2x10  2x10                  PBall hamstring curls    2x10  2x10                  single leg bridge    2x10  2x10                  lunges   2x10  2x10                  TB sidestepping   OTB    GTB                  Ecc resisted hamstring  curl     OTB  2x10                                                                                                                                                                                                                                                                       Modalities                                                                                                    Assessment: Patient reports some relief post IASTM  Plan: Continue per plan of care

## 2018-05-18 ENCOUNTER — OFFICE VISIT (OUTPATIENT)
Dept: PHYSICAL THERAPY | Facility: REHABILITATION | Age: 28
End: 2018-05-18
Payer: COMMERCIAL

## 2018-05-18 DIAGNOSIS — M25.561 ACUTE PAIN OF RIGHT KNEE: Primary | ICD-10-CM

## 2018-05-18 PROCEDURE — 97140 MANUAL THERAPY 1/> REGIONS: CPT

## 2018-05-18 PROCEDURE — 97112 NEUROMUSCULAR REEDUCATION: CPT

## 2018-05-18 PROCEDURE — 97110 THERAPEUTIC EXERCISES: CPT

## 2018-05-18 NOTE — PROGRESS NOTES
Daily Note     Today's date: 2018  Patient name: Sofie Zaragoza  : 1990  MRN: [id number]  Referring provider: Shanelle Husain DO  Dx:   Encounter Diagnosis     ICD-10-CM    1  Acute pain of right knee M25 561                   Subjective: Patient states that he is feeling a little less pain today  Reports that he has been trying to work on his glut and hamstring stretching  Objective: See treatment diary below  Precautions: Asthma, HTN, headaches  Daily Treatment Diary      Manual                   Gr  5 p/a fib head FB  RS                   Lat tib shift correction FB                     Sustained hold tibial ER correction FB                     RLE navicular sling tape FB                      IASTM distal hamstring    HS                         Exercise Diary                 Long sit SLr flex 2x5, 5"  5"  2x10  5"  2x10                 Clam shell 2x10, 5"  5" 2x10  5"  2x10                 bridge 2x10, 5"  5" 2x10  5"  2x10                 Seated hamstring AROM stretch 5x10"  5x10"  5x10"                  active hamstring stretch    2x10  2x10                  PBall hamstring curls    2x10  2x10                  single leg bridge    2x10  2x10                  lunge walks   2x10  2x10 2x10                 TB sidestepping/"X" walks   OTB    GTB  GTB                Ecc resisted hamstring  curl     OTB  2x10                  total gym       Lvl 7  5'                total gym sidelying         2' ea                BOSU squats       2x10                                                                                                                                                                                             Modalities                                                                                                    Assessment: Fatigued post session, pt's glut strength shows improvements  Plan: Continue per plan of care

## 2018-05-22 ENCOUNTER — APPOINTMENT (OUTPATIENT)
Dept: PHYSICAL THERAPY | Facility: REHABILITATION | Age: 28
End: 2018-05-22
Payer: COMMERCIAL

## 2018-05-29 ENCOUNTER — OFFICE VISIT (OUTPATIENT)
Dept: PHYSICAL THERAPY | Facility: REHABILITATION | Age: 28
End: 2018-05-29
Payer: COMMERCIAL

## 2018-05-29 DIAGNOSIS — M25.561 ACUTE PAIN OF RIGHT KNEE: Primary | ICD-10-CM

## 2018-05-29 PROCEDURE — 97110 THERAPEUTIC EXERCISES: CPT | Performed by: PHYSICAL THERAPIST

## 2018-05-29 PROCEDURE — 97112 NEUROMUSCULAR REEDUCATION: CPT | Performed by: PHYSICAL THERAPIST

## 2018-05-29 PROCEDURE — G8992 OTHER PT/OT  D/C STATUS: HCPCS | Performed by: PHYSICAL THERAPIST

## 2018-05-29 PROCEDURE — G8991 OTHER PT/OT GOAL STATUS: HCPCS | Performed by: PHYSICAL THERAPIST

## 2018-05-29 NOTE — PROGRESS NOTES
Discharge Summary    Today's date: 2018  Patient name: Antolin Ott  : 1990  MRN: [id number]  Referring provider: Simone Landeros DO  Dx:   Encounter Diagnosis     ICD-10-CM    1  Acute pain of right knee M25 561        Start Time: 1450  Stop Time: 1530  Total time in clinic (min): 40 minutes    Subjective: Patient reports no limitations at this time  Reports being able to run without any pain and reports being 100%  Objective: See treatment diary below    Pain  Current pain ratin  At best pain ratin  At worst pain ratin  Quality: dull ache  Aggravating factors: walking and stair climbing    Precautions: Asthma, HTN, headaches  Daily Treatment Diary      Manual                   Gr  5 p/a fib head FB  RS                   Lat tib shift correction FB                     Sustained hold tibial ER correction FB                     RLE navicular sling tape FB                      IASTM distal hamstring    HS                         Exercise Diary               Long sit SLr flex 2x5, 5"  5"  2x10  5"  2x10                 Clam shell 2x10, 5"  5" 2x10  5"  2x10                 Seated hamstring AROM stretch 5x10"  5x10"  5x10"                  active hamstring stretch    2x10  2x10                  PBall hamstring curls    2x10  2x10                  single leg bridge    2x10  2x10    2x10, 5"              lunge walks   2x10  2x10 2x10   2x10              TB sidestepping/"X" walks   OTB    GTB  GTB  GTB              Ecc resisted hamstring  curl     OTB  2x10    BTB 2x10              total gym       Lvl 7  5'  Lvl 7  6'              total gym sidelying         2' ea                BOSU squats       2x10  2x10             Single leg DL         2x10                                                                                        LE exam  Squat assess: normal squat mechanics    ¼ squat assess: RLe genu valgum > LLE                MMT     Hip L       R   Flex  5 5   Extn  5 5   Abd  5 4   Add  5 5   IR  5 5   ER  5 4           G  Max 4 4   G  Med  4 4                  MMT          AROM          PROM     Knee         L        R        L         R         L          R   Flex  5 5 WNL WNL       Extn  5 5 WNL WNL                                       MMT     Ankle       L        R   PF 5 5   DF  5 5   EHL 5 5      Straight leg raise:   L= -   R= -          Hip:  scour=  -   lorri =    Fadir=   joint mobility= -      long axis distraction=    Vickys=  -      Osvaldo test=  -          hamstring dominance test=  -   hip abd/lat rot =  -        Knee: post drawer = -  Lachmans test=  -  anterior draw test=  -  Valgus stress test=  -   varus stress test =  -   Sebastian test =  -    Patella grind test=  -   Patella mobility test= -   Thessaly=  +  Appley test= -  joint findings=  normal joint positioning  Assessment: Patient demonstrates correct performance with HEP at this time  Reports no limitations at this time  Short Term:  1  Pt will report decreased levels of pain by at least 2 subjective ratings in 4 weeks  Met  2  Pt will demonstrate improved ROM by at least 10 degrees in 4 weeks  Met  3  Pt will demonstrate improved strength by 1/2 grade  Met  4  Pt will be able to descend steps without pain in 4 weeks  Met  Long Term:   1  Pt will be independent in their HEP in 8 weeks  Met  2  Pt will be be pain free with IADL's  Met   3  Pt will demonstrate improved FOTO, > 12  Met  4  Pt will be able to return to running for exercise in 8 weeks  Met      Plan: Discharge to HEP  Patient to contact clinic via phone PRN

## 2018-07-19 ENCOUNTER — APPOINTMENT (OUTPATIENT)
Dept: LAB | Facility: HOSPITAL | Age: 28
End: 2018-07-19
Payer: COMMERCIAL

## 2018-07-19 ENCOUNTER — TRANSCRIBE ORDERS (OUTPATIENT)
Dept: LAB | Facility: HOSPITAL | Age: 28
End: 2018-07-19

## 2018-07-19 DIAGNOSIS — Z00.8 HEALTH EXAMINATION IN POPULATION SURVEY: Primary | ICD-10-CM

## 2018-07-19 DIAGNOSIS — Z00.8 HEALTH EXAMINATION IN POPULATION SURVEY: ICD-10-CM

## 2018-07-19 DIAGNOSIS — R07.89 CHEST DISCOMFORT: ICD-10-CM

## 2018-07-19 DIAGNOSIS — I10 BENIGN ESSENTIAL HYPERTENSION: ICD-10-CM

## 2018-07-19 LAB
CHOLEST SERPL-MCNC: 151 MG/DL (ref 50–200)
EST. AVERAGE GLUCOSE BLD GHB EST-MCNC: 97 MG/DL
HBA1C MFR BLD: 5 % (ref 4.2–6.3)
HDLC SERPL-MCNC: 53 MG/DL (ref 40–60)
LDLC SERPL CALC-MCNC: 80 MG/DL (ref 0–100)
NONHDLC SERPL-MCNC: 98 MG/DL
TRIGL SERPL-MCNC: 88 MG/DL

## 2018-07-19 PROCEDURE — 80061 LIPID PANEL: CPT

## 2018-07-19 PROCEDURE — 83036 HEMOGLOBIN GLYCOSYLATED A1C: CPT

## 2018-07-19 PROCEDURE — 36415 COLL VENOUS BLD VENIPUNCTURE: CPT

## 2018-07-19 RX ORDER — AMLODIPINE BESYLATE 2.5 MG/1
5 TABLET ORAL DAILY
Qty: 180 TABLET | Refills: 1 | Status: SHIPPED | OUTPATIENT
Start: 2018-07-19 | End: 2020-02-10 | Stop reason: SDUPTHER

## 2018-07-20 ENCOUNTER — TELEPHONE (OUTPATIENT)
Dept: FAMILY MEDICINE CLINIC | Facility: CLINIC | Age: 28
End: 2018-07-20

## 2018-07-20 NOTE — TELEPHONE ENCOUNTER
----- Message from Samantha Pollard sent at 7/19/2018  5:50 PM EDT -----  Regarding: Non-Urgent Medical Question  Contact: [phone]  Hey there Dr Brissa Ngo     About this time last year we double checked my billireubens since it was higher than usual  You were debating on whether or not to follow up with it  Do you think it's worth following up to be drawn again to see or what? Thanks for all your help!     [employer]

## 2018-07-23 DIAGNOSIS — R17 ELEVATED BILIRUBIN: Primary | ICD-10-CM

## 2018-10-05 ENCOUNTER — APPOINTMENT (OUTPATIENT)
Dept: LAB | Facility: HOSPITAL | Age: 28
End: 2018-10-05
Payer: COMMERCIAL

## 2018-10-05 DIAGNOSIS — R17 ELEVATED BILIRUBIN: ICD-10-CM

## 2018-10-05 LAB
ALBUMIN SERPL BCP-MCNC: 4.3 G/DL (ref 3.5–5)
ALP SERPL-CCNC: 57 U/L (ref 46–116)
ALT SERPL W P-5'-P-CCNC: 19 U/L (ref 12–78)
ANION GAP SERPL CALCULATED.3IONS-SCNC: 4 MMOL/L (ref 4–13)
AST SERPL W P-5'-P-CCNC: 12 U/L (ref 5–45)
BILIRUB DIRECT SERPL-MCNC: 0.31 MG/DL (ref 0–0.2)
BILIRUB SERPL-MCNC: 1.81 MG/DL (ref 0.2–1)
BUN SERPL-MCNC: 14 MG/DL (ref 5–25)
CALCIUM SERPL-MCNC: 9.1 MG/DL (ref 8.3–10.1)
CHLORIDE SERPL-SCNC: 108 MMOL/L (ref 100–108)
CO2 SERPL-SCNC: 29 MMOL/L (ref 21–32)
CREAT SERPL-MCNC: 0.89 MG/DL (ref 0.6–1.3)
GFR SERPL CREATININE-BSD FRML MDRD: 118 ML/MIN/1.73SQ M
GLUCOSE SERPL-MCNC: 65 MG/DL (ref 65–140)
POTASSIUM SERPL-SCNC: 4.1 MMOL/L (ref 3.5–5.3)
PROT SERPL-MCNC: 7.6 G/DL (ref 6.4–8.2)
SODIUM SERPL-SCNC: 141 MMOL/L (ref 136–145)

## 2018-10-05 PROCEDURE — 36415 COLL VENOUS BLD VENIPUNCTURE: CPT

## 2018-10-05 PROCEDURE — 80053 COMPREHEN METABOLIC PANEL: CPT

## 2018-10-05 PROCEDURE — 82248 BILIRUBIN DIRECT: CPT

## 2019-01-08 DIAGNOSIS — I10 BENIGN ESSENTIAL HYPERTENSION: ICD-10-CM

## 2019-01-08 DIAGNOSIS — R07.89 CHEST DISCOMFORT: ICD-10-CM

## 2019-01-08 RX ORDER — AMLODIPINE BESYLATE 2.5 MG/1
5 TABLET ORAL DAILY
Qty: 180 TABLET | Refills: 0 | Status: CANCELLED | OUTPATIENT
Start: 2019-01-08

## 2019-01-22 ENCOUNTER — OFFICE VISIT (OUTPATIENT)
Dept: FAMILY MEDICINE CLINIC | Facility: CLINIC | Age: 29
End: 2019-01-22
Payer: COMMERCIAL

## 2019-01-22 VITALS
HEIGHT: 70 IN | RESPIRATION RATE: 16 BRPM | OXYGEN SATURATION: 98 % | SYSTOLIC BLOOD PRESSURE: 110 MMHG | TEMPERATURE: 98.8 F | WEIGHT: 178 LBS | DIASTOLIC BLOOD PRESSURE: 70 MMHG | BODY MASS INDEX: 25.48 KG/M2 | HEART RATE: 78 BPM

## 2019-01-22 DIAGNOSIS — K52.9 COLITIS: Primary | ICD-10-CM

## 2019-01-22 PROCEDURE — 99213 OFFICE O/P EST LOW 20 MIN: CPT | Performed by: FAMILY MEDICINE

## 2019-01-22 PROCEDURE — 3008F BODY MASS INDEX DOCD: CPT | Performed by: FAMILY MEDICINE

## 2019-01-22 NOTE — PROGRESS NOTES
Assessment/Plan:   1  Colitis  Reviewed patient's symptoms today  Dot secondary to possible colitis  Continue with supportive care  Maintain hydration  He may start a clear liquid diet slowly advancing as tolerated  Will continue with dot if any symptoms are worsening, he was advised to follow up  Subjective:    Chief Complaint   Patient presents with    digestive issues     x 5 days /nausea,cant eat like he used to        Patient ID: Oscar Baker is a 34 y o  male  Abdominal Pain   This is a new problem  The current episode started in the past 7 days  The problem occurs intermittently  The problem has been unchanged  Associated symptoms include constipation, headaches, nausea and vomiting  Pertinent negatives include no arthralgias, diarrhea, dysuria, fever, frequency, hematuria or myalgias  Review of Systems   Constitutional: Positive for chills  Negative for activity change, fatigue and fever  HENT: Negative for congestion, ear pain, sinus pressure and sore throat  Eyes: Negative for redness, itching and visual disturbance  Respiratory: Negative for cough and shortness of breath  Cardiovascular: Negative for chest pain and palpitations  Gastrointestinal: Positive for abdominal pain, constipation, nausea and vomiting  Negative for diarrhea  Endocrine: Negative for cold intolerance and heat intolerance  Genitourinary: Negative for dysuria, flank pain, frequency and hematuria  Musculoskeletal: Negative for arthralgias, back pain, gait problem and myalgias  Skin: Negative for color change  Allergic/Immunologic: Negative for environmental allergies  Neurological: Positive for headaches  Negative for dizziness and numbness  Psychiatric/Behavioral: Negative for behavioral problems and sleep disturbance           The following portions of the patient's history were reviewed and updated as appropriate : past family history, past medical history, past social history and past surgical history  Current Outpatient Prescriptions:     amLODIPine (NORVASC) 2 5 mg tablet, Take 2 tablets (5 mg total) by mouth daily, Disp: 180 tablet, Rfl: 1    fexofenadine (ALLEGRA) 180 MG tablet, Take 1 tablet by mouth as needed  , Disp: , Rfl:     Magnesium Oxide 500 MG (LAX) TABS, Take 1 tablet by mouth daily, Disp: , Rfl:     SUMAtriptan (IMITREX) 50 mg tablet, Take 1 tablet by mouth as needed  , Disp: , Rfl:     Objective:    Vitals:    01/22/19 1242   BP: 110/70   BP Location: Left arm   Patient Position: Sitting   Cuff Size: Adult   Pulse: 78   Resp: 16   Temp: 98 8 °F (37 1 °C)   TempSrc: Tympanic   SpO2: 98%   Weight: 80 7 kg (178 lb)   Height: 5' 9 69" (1 77 m)        Physical Exam   Constitutional: He is oriented to person, place, and time  He appears well-developed and well-nourished  HENT:   Head: Normocephalic and atraumatic  Nose: Nose normal    Mouth/Throat: No oropharyngeal exudate  Eyes: Pupils are equal, round, and reactive to light  Right eye exhibits no discharge  Left eye exhibits no discharge  Neck: Normal range of motion  Neck supple  No tracheal deviation present  Cardiovascular: Normal rate, regular rhythm and intact distal pulses  Exam reveals no gallop and no friction rub  No murmur heard  Pulses:       Dorsalis pedis pulses are 2+ on the right side, and 2+ on the left side  Posterior tibial pulses are 2+ on the right side, and 2+ on the left side  Pulmonary/Chest: Effort normal and breath sounds normal  No respiratory distress  He has no wheezes  He has no rales  Abdominal: Soft  Bowel sounds are normal  He exhibits no distension  There is no tenderness  There is no rebound and no guarding  Musculoskeletal: Normal range of motion  He exhibits no edema  Lymphadenopathy:        Head (right side): No submental and no submandibular adenopathy present  Head (left side): No submental and no submandibular adenopathy present       He has no cervical adenopathy  Right cervical: No superficial cervical, no deep cervical and no posterior cervical adenopathy present  Left cervical: No superficial cervical, no deep cervical and no posterior cervical adenopathy present  Neurological: He is alert and oriented to person, place, and time  No cranial nerve deficit or sensory deficit  Skin: Skin is warm, dry and intact  Psychiatric: His speech is normal and behavior is normal  Judgment normal  His mood appears not anxious  Cognition and memory are normal  He does not exhibit a depressed mood  Vitals reviewed

## 2019-03-07 ENCOUNTER — TELEPHONE (OUTPATIENT)
Dept: FAMILY MEDICINE CLINIC | Facility: CLINIC | Age: 29
End: 2019-03-07

## 2019-03-07 DIAGNOSIS — R17 ELEVATED BILIRUBIN: ICD-10-CM

## 2019-03-07 DIAGNOSIS — I10 BENIGN ESSENTIAL HYPERTENSION: Primary | ICD-10-CM

## 2019-03-07 NOTE — TELEPHONE ENCOUNTER
Regarding: FW: Test Results Question  Contact: [phone]  Every 6 months  ----- Message -----  From: Ann Edmond MA  Sent: 3/6/2019   7:38 PM  To: Rosemarie Turcios DO  Subject: FW: Test Results Question                        How often do you want reoccurrence and what diagnosis should we link it to   ----- Message -----  From: Rosemarie Turcios DO  Sent: 3/6/2019   8:35 AM  To: [facility]  Subject: FW: Test Results Question                        Please place reoccurring order for cmp for patient  Thank you  ----- Message -----  From: Ann Edmond MA  Sent: 3/5/2019   6:50 PM  To: Rosemarie Turcios DO  Subject: FW: Test Results Question                            ----- Message -----  From: Guido Winters  Sent: 3/4/2019   5:59 PM  To: [facility]  Subject: Test Results Question                            ----- Message from [address] [place] [address], [facility] sent at 3/4/2019  5:59 PM EST -----    Hey there Dr Hira Langley!    Would it be possible to put in a standing order to test for my billiruben/liver function testing every 6 months or so? We discussed about doing so the last routine check-up and would like to set that up due to my billiruben being higher than usual results  Let me know what you think

## 2019-04-01 ENCOUNTER — APPOINTMENT (OUTPATIENT)
Dept: LAB | Facility: HOSPITAL | Age: 29
End: 2019-04-01
Payer: COMMERCIAL

## 2019-04-01 ENCOUNTER — TRANSCRIBE ORDERS (OUTPATIENT)
Dept: LAB | Facility: HOSPITAL | Age: 29
End: 2019-04-01

## 2019-04-01 DIAGNOSIS — R17 ELEVATED BILIRUBIN: ICD-10-CM

## 2019-04-01 LAB
ALBUMIN SERPL BCP-MCNC: 4.3 G/DL (ref 3.5–5)
ALP SERPL-CCNC: 72 U/L (ref 46–116)
ALT SERPL W P-5'-P-CCNC: 21 U/L (ref 12–78)
ANION GAP SERPL CALCULATED.3IONS-SCNC: 3 MMOL/L (ref 4–13)
AST SERPL W P-5'-P-CCNC: 14 U/L (ref 5–45)
BILIRUB SERPL-MCNC: 1.42 MG/DL (ref 0.2–1)
BUN SERPL-MCNC: 13 MG/DL (ref 5–25)
CALCIUM SERPL-MCNC: 9 MG/DL (ref 8.3–10.1)
CHLORIDE SERPL-SCNC: 106 MMOL/L (ref 100–108)
CO2 SERPL-SCNC: 29 MMOL/L (ref 21–32)
CREAT SERPL-MCNC: 0.9 MG/DL (ref 0.6–1.3)
GFR SERPL CREATININE-BSD FRML MDRD: 117 ML/MIN/1.73SQ M
GLUCOSE P FAST SERPL-MCNC: 90 MG/DL (ref 65–99)
POTASSIUM SERPL-SCNC: 3.9 MMOL/L (ref 3.5–5.3)
PROT SERPL-MCNC: 7.7 G/DL (ref 6.4–8.2)
SODIUM SERPL-SCNC: 138 MMOL/L (ref 136–145)

## 2019-04-01 PROCEDURE — 36415 COLL VENOUS BLD VENIPUNCTURE: CPT

## 2019-04-01 PROCEDURE — 80053 COMPREHEN METABOLIC PANEL: CPT

## 2019-05-14 ENCOUNTER — OFFICE VISIT (OUTPATIENT)
Dept: FAMILY MEDICINE CLINIC | Facility: CLINIC | Age: 29
End: 2019-05-14
Payer: COMMERCIAL

## 2019-05-14 VITALS
WEIGHT: 186 LBS | HEART RATE: 70 BPM | HEIGHT: 70 IN | RESPIRATION RATE: 16 BRPM | SYSTOLIC BLOOD PRESSURE: 120 MMHG | DIASTOLIC BLOOD PRESSURE: 80 MMHG | OXYGEN SATURATION: 98 % | BODY MASS INDEX: 26.63 KG/M2 | TEMPERATURE: 97.2 F
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
## 2019-05-14 DIAGNOSIS — Z23 NEED FOR VACCINATION: Primary | ICD-10-CM

## 2019-05-14 PROCEDURE — 1036F TOBACCO NON-USER: CPT | Performed by: FAMILY MEDICINE

## 2019-05-14 PROCEDURE — 90471 IMMUNIZATION ADMIN: CPT | Performed by: FAMILY MEDICINE

## 2019-05-14 PROCEDURE — 99213 OFFICE O/P EST LOW 20 MIN: CPT | Performed by: FAMILY MEDICINE

## 2019-05-14 PROCEDURE — 3008F BODY MASS INDEX DOCD: CPT | Performed by: FAMILY MEDICINE

## 2019-05-14 PROCEDURE — 90715 TDAP VACCINE 7 YRS/> IM: CPT | Performed by: FAMILY MEDICINE

## 2019-11-04 ENCOUNTER — APPOINTMENT (OUTPATIENT)
Dept: LAB | Facility: CLINIC | Age: 29
End: 2019-11-04
Payer: COMMERCIAL

## 2019-11-04 ENCOUNTER — OFFICE VISIT (OUTPATIENT)
Dept: FAMILY MEDICINE CLINIC | Facility: CLINIC | Age: 29
End: 2019-11-04
Payer: COMMERCIAL

## 2019-11-04 ENCOUNTER — TELEPHONE (OUTPATIENT)
Dept: FAMILY MEDICINE CLINIC | Facility: CLINIC | Age: 29
End: 2019-11-04

## 2019-11-04 VITALS
HEART RATE: 74 BPM | RESPIRATION RATE: 16 BRPM | DIASTOLIC BLOOD PRESSURE: 80 MMHG | SYSTOLIC BLOOD PRESSURE: 134 MMHG | WEIGHT: 187.6 LBS | OXYGEN SATURATION: 98 % | TEMPERATURE: 97.6 F | BODY MASS INDEX: 26.86 KG/M2 | HEIGHT: 70 IN

## 2019-11-04 DIAGNOSIS — R10.84 DIFFUSE ABDOMINAL PAIN: Primary | ICD-10-CM

## 2019-11-04 DIAGNOSIS — R10.84 DIFFUSE ABDOMINAL PAIN: ICD-10-CM

## 2019-11-04 DIAGNOSIS — K52.9 COLITIS: ICD-10-CM

## 2019-11-04 LAB
ANION GAP SERPL CALCULATED.3IONS-SCNC: 7 MMOL/L (ref 4–13)
BUN SERPL-MCNC: 10 MG/DL (ref 5–25)
CALCIUM SERPL-MCNC: 9.2 MG/DL (ref 8.3–10.1)
CHLORIDE SERPL-SCNC: 108 MMOL/L (ref 100–108)
CO2 SERPL-SCNC: 26 MMOL/L (ref 21–32)
CREAT SERPL-MCNC: 0.95 MG/DL (ref 0.6–1.3)
GFR SERPL CREATININE-BSD FRML MDRD: 109 ML/MIN/1.73SQ M
GLUCOSE P FAST SERPL-MCNC: 93 MG/DL (ref 65–99)
POTASSIUM SERPL-SCNC: 4.4 MMOL/L (ref 3.5–5.3)
SODIUM SERPL-SCNC: 141 MMOL/L (ref 136–145)

## 2019-11-04 PROCEDURE — 99214 OFFICE O/P EST MOD 30 MIN: CPT | Performed by: FAMILY MEDICINE

## 2019-11-04 PROCEDURE — 36415 COLL VENOUS BLD VENIPUNCTURE: CPT

## 2019-11-04 PROCEDURE — 3008F BODY MASS INDEX DOCD: CPT | Performed by: FAMILY MEDICINE

## 2019-11-04 PROCEDURE — 80048 BASIC METABOLIC PNL TOTAL CA: CPT

## 2019-11-04 RX ORDER — DICYCLOMINE HCL 20 MG
20 TABLET ORAL 3 TIMES DAILY PRN
Qty: 30 TABLET | Refills: 0 | Status: SHIPPED | OUTPATIENT
Start: 2019-11-04 | End: 2021-09-07

## 2019-11-04 NOTE — PROGRESS NOTES
Assessment/Plan:    1  Diffuse abdominal pain  Reviewed patient's symptoms today  At this time, is unclear as to the exact cause of his diffuse abdominal pain  This abdominal pain has been recurrent  He last had this problem back in January  There is concern for some possible inflammatory process  Reviewed differential including inflammatory bowel disease  At this time, will check CT of abdomen pelvis with contrast   Start a clear liquid diet slowly advancing as tolerated  He may take dicyclomine mg t i d  P r n  For symptom relief  If any symptoms should worsen, he must call immediately  - dicyclomine (BENTYL) 20 mg tablet; Take 1 tablet (20 mg total) by mouth 3 (three) times a day as needed (for abdominal cramping) for up to 10 days  Dispense: 30 tablet; Refill: 0  - CT abdomen pelvis w contrast; Future  - Basic metabolic panel; Future    2  Colitis  - CT abdomen pelvis w contrast; Future     There are no diagnoses linked to this encounter  Subjective:    Chief Complaint   Patient presents with    Abdominal Pain     lower abdominal pain         Patient ID: Jil Cortes is a 34 y o  male  Abdominal Pain   This is a recurrent problem  Episode onset: Saturday morning (2 days ago) The onset quality is gradual  The problem occurs intermittently  The problem has been waxing and waning  The pain is located in the generalized abdominal region  The pain is at a severity of 7/10  The pain is mild  The quality of the pain is colicky and dull  Associated symptoms include nausea  Pertinent negatives include no arthralgias, belching, constipation, diarrhea, dysuria, fever, frequency, headaches, hematochezia, hematuria, myalgias or vomiting  Nothing aggravates the pain  The pain is relieved by nothing  Treatments tried: probiotics  Review of Systems   Constitutional: Negative for activity change, chills, fatigue and fever  HENT: Negative for congestion, ear pain, sinus pressure and sore throat  Eyes: Negative for redness, itching and visual disturbance  Respiratory: Negative for cough and shortness of breath  Cardiovascular: Negative for chest pain and palpitations  Gastrointestinal: Positive for abdominal pain and nausea  Negative for constipation, diarrhea, hematochezia and vomiting  Endocrine: Negative for cold intolerance and heat intolerance  Genitourinary: Negative for dysuria, flank pain, frequency and hematuria  Musculoskeletal: Negative for arthralgias, back pain, gait problem and myalgias  Skin: Negative for color change  Allergic/Immunologic: Negative for environmental allergies  Neurological: Negative for dizziness, numbness and headaches  Psychiatric/Behavioral: Negative for behavioral problems and sleep disturbance  The following portions of the patient's history were reviewed and updated as appropriate : past family history, past medical history, past social history and past surgical history  Current Outpatient Medications:     amLODIPine (NORVASC) 2 5 mg tablet, Take 2 tablets (5 mg total) by mouth daily, Disp: 180 tablet, Rfl: 1    fexofenadine (ALLEGRA) 180 MG tablet, Take 1 tablet by mouth as needed  , Disp: , Rfl:     Magnesium Oxide 500 MG (LAX) TABS, Take 1 tablet by mouth daily, Disp: , Rfl:     SUMAtriptan (IMITREX) 50 mg tablet, Take 1 tablet by mouth as needed  , Disp: , Rfl:     Objective:    Vitals:    11/04/19 1345   BP: 134/80   BP Location: Left arm   Patient Position: Sitting   Cuff Size: Adult   Pulse: 74   Resp: 16   Temp: 97 6 °F (36 4 °C)   TempSrc: Tympanic   SpO2: 98%   Weight: 85 1 kg (187 lb 9 6 oz)   Height: 5' 9 5" (1 765 m)        Physical Exam   Constitutional: He is oriented to person, place, and time  He appears well-developed and well-nourished  HENT:   Head: Normocephalic and atraumatic  Nose: Nose normal    Mouth/Throat: No oropharyngeal exudate  Eyes: Pupils are equal, round, and reactive to light   Right eye exhibits no discharge  Left eye exhibits no discharge  Neck: Normal range of motion  Neck supple  No tracheal deviation present  Cardiovascular: Normal rate, regular rhythm and intact distal pulses  Exam reveals no gallop and no friction rub  No murmur heard  Pulses:       Dorsalis pedis pulses are 2+ on the right side, and 2+ on the left side  Posterior tibial pulses are 2+ on the right side, and 2+ on the left side  Pulmonary/Chest: Effort normal and breath sounds normal  No respiratory distress  He has no wheezes  He has no rales  Abdominal: Soft  He exhibits no distension  Bowel sounds are increased  There is generalized tenderness and tenderness in the right upper quadrant, right lower quadrant, left upper quadrant and left lower quadrant  There is guarding  There is no rigidity, no rebound and no CVA tenderness  Musculoskeletal: Normal range of motion  He exhibits no edema  Lymphadenopathy:        Head (right side): No submental and no submandibular adenopathy present  Head (left side): No submental and no submandibular adenopathy present  He has no cervical adenopathy  Right cervical: No superficial cervical, no deep cervical and no posterior cervical adenopathy present  Left cervical: No superficial cervical, no deep cervical and no posterior cervical adenopathy present  Neurological: He is alert and oriented to person, place, and time  No cranial nerve deficit or sensory deficit  Skin: Skin is warm, dry and intact  Psychiatric: His speech is normal and behavior is normal  Judgment normal  His mood appears not anxious  Cognition and memory are normal  He does not exhibit a depressed mood  Vitals reviewed

## 2019-11-06 ENCOUNTER — HOSPITAL ENCOUNTER (OUTPATIENT)
Dept: RADIOLOGY | Facility: HOSPITAL | Age: 29
Discharge: HOME | End: 2019-11-06
Payer: COMMERCIAL

## 2019-11-06 ENCOUNTER — TRANSCRIBE ORDERS (OUTPATIENT)
Dept: RADIOLOGY | Facility: HOSPITAL | Age: 29
End: 2019-11-06

## 2019-11-06 DIAGNOSIS — R10.84 DIFFUSE ABDOMINAL PAIN: ICD-10-CM

## 2019-11-06 DIAGNOSIS — K52.9 COLITIS: ICD-10-CM

## 2019-11-06 PROCEDURE — 74177 CT ABD & PELVIS W/CONTRAST: CPT

## 2019-11-06 RX ADMIN — IOHEXOL 100 ML: 350 INJECTION, SOLUTION INTRAVENOUS at 11:30

## 2020-02-05 ENCOUNTER — TELEPHONE (OUTPATIENT)
Dept: FAMILY MEDICINE CLINIC | Facility: CLINIC | Age: 30
End: 2020-02-05

## 2020-02-05 NOTE — TELEPHONE ENCOUNTER
----- Message from Hamzah Singh DO sent at 2/5/2020  9:46 AM EST -----  Regarding: FW: Prescription Question  Contact: [phone]   Please call patient, his recent potassium level which was checked in November appeared stable at 4 4  Given this recent level, he was advised that he will not likely need a potassium supplement  If he is concerned about his blood pressure, please have him check a blood pressure log for the next 2 weeks  He may drop this log off and we can review this at that time  Thank you  ----- Message -----  From: Beto Lechuga MA  Sent: 2/4/2020   7:11 PM EST  To: Hamzah Singh DO  Subject: FW: Prescription Question                            ----- Message -----  From: Kaur Flavors  Sent: 2/4/2020   5:04 PM EST  To: [facility]  Subject: Prescription Question                            Hey there Dr Amy Umanzor,    I wanted to potentially talk to you about a potassium supplement for my high blood pressure? The current medication does not seem to be helping and feel my high blood pressure on days I don't eat as healthy  I've been tracking my diet and trying to lower my sodium  I know potassium needs to be in ratio but I'm not hitting the levels recommendation  Is it possible to be prescribed a potassium supplement to help with this? Or what would you recommend moving forward?     Thanks,    [employer]

## 2020-02-10 ENCOUNTER — OFFICE VISIT (OUTPATIENT)
Dept: FAMILY MEDICINE CLINIC | Facility: CLINIC | Age: 30
End: 2020-02-10
Payer: COMMERCIAL

## 2020-02-10 VITALS
OXYGEN SATURATION: 97 % | WEIGHT: 195 LBS | DIASTOLIC BLOOD PRESSURE: 86 MMHG | HEIGHT: 70 IN | SYSTOLIC BLOOD PRESSURE: 130 MMHG | BODY MASS INDEX: 27.92 KG/M2 | RESPIRATION RATE: 17 BRPM | HEART RATE: 73 BPM | TEMPERATURE: 97.3 F

## 2020-02-10 DIAGNOSIS — R00.2 PALPITATION: ICD-10-CM

## 2020-02-10 DIAGNOSIS — I10 BENIGN ESSENTIAL HYPERTENSION: Primary | ICD-10-CM

## 2020-02-10 PROCEDURE — 3079F DIAST BP 80-89 MM HG: CPT | Performed by: FAMILY MEDICINE

## 2020-02-10 PROCEDURE — 99214 OFFICE O/P EST MOD 30 MIN: CPT | Performed by: FAMILY MEDICINE

## 2020-02-10 PROCEDURE — 3075F SYST BP GE 130 - 139MM HG: CPT | Performed by: FAMILY MEDICINE

## 2020-02-10 PROCEDURE — 1036F TOBACCO NON-USER: CPT | Performed by: FAMILY MEDICINE

## 2020-02-10 RX ORDER — AMLODIPINE BESYLATE 5 MG/1
5 TABLET ORAL DAILY
Qty: 90 TABLET | Refills: 1 | Status: SHIPPED | OUTPATIENT
Start: 2020-02-10 | End: 2020-09-02 | Stop reason: SDUPTHER

## 2020-02-10 RX ORDER — HYDROCHLOROTHIAZIDE 12.5 MG/1
12.5 CAPSULE, GELATIN COATED ORAL DAILY
Qty: 90 CAPSULE | Refills: 1 | Status: SHIPPED | OUTPATIENT
Start: 2020-02-10 | End: 2020-09-02 | Stop reason: SDUPTHER

## 2020-02-10 NOTE — PROGRESS NOTES
Assessment/Plan:   1  Benign essential hypertension/Palpitation  Patient appears clinically and hemodynamically stable today  At this time reviewed his previous cardiac testing with him  It appears that his symptoms may likely be secondary to his elevated blood pressure readings  Will continue with his amlodipine however increase this dose to 5 mg daily  Will add 12 5 mg of hydrochlorothiazide further blood pressure control  If he notes any persistent problems with palpitations, will consider checking a Holter monitor   - amLODIPine (NORVASC) 5 mg tablet; Take 1 tablet (5 mg total) by mouth daily  Dispense: 90 tablet; Refill: 1  - hydrochlorothiazide (MICROZIDE) 12 5 mg capsule; Take 1 capsule (12 5 mg total) by mouth daily  Dispense: 90 capsule; Refill: 1    BMI Counseling: Body mass index is 28 38 kg/m²  The BMI is above normal  Nutrition recommendations include decreasing portion sizes, encouraging healthy choices of fruits and vegetables, decreasing fast food intake, consuming healthier snacks and limiting drinks that contain sugar  Exercise recommendations include moderate physical activity 150 minutes/week and exercising 3-5 times per week  No pharmacotherapy was ordered  There are no diagnoses linked to this encounter  Subjective:       Chief Complaint   Patient presents with    Blood Pressure Check      Patient ID: Nicho Vaughan is a 34 y o  male  Hypertension   This is a chronic problem  The current episode started more than 1 year ago  The problem has been gradually worsening since onset  The problem is resistant (ranging in the 130-150)  Associated symptoms include neck pain and palpitations  Pertinent negatives include no anxiety, blurred vision, chest pain, headaches, malaise/fatigue, orthopnea, peripheral edema, PND, shortness of breath or sweats  There are no associated agents to hypertension  Risk factors for coronary artery disease include stress   Past treatments include calcium channel blockers  The current treatment provides mild improvement  There are no compliance problems  Review of Systems   Constitutional: Negative for activity change, chills, fatigue, fever and malaise/fatigue  HENT: Negative for congestion, ear pain, sinus pressure and sore throat  Eyes: Negative for blurred vision, redness, itching and visual disturbance  Respiratory: Negative for cough and shortness of breath  Cardiovascular: Positive for palpitations  Negative for chest pain, orthopnea and PND  Gastrointestinal: Negative for abdominal pain, diarrhea and nausea  Endocrine: Negative for cold intolerance and heat intolerance  Genitourinary: Negative for dysuria, flank pain and frequency  Musculoskeletal: Positive for neck pain  Negative for arthralgias, back pain, gait problem and myalgias  Skin: Negative for color change  Allergic/Immunologic: Negative for environmental allergies  Neurological: Negative for dizziness, numbness and headaches  Psychiatric/Behavioral: Negative for behavioral problems and sleep disturbance  The following portions of the patient's history were reviewed and updated as appropriate : past family history, past medical history, past social history and past surgical history      Current Outpatient Medications:     amLODIPine (NORVASC) 2 5 mg tablet, Take 2 tablets (5 mg total) by mouth daily, Disp: 180 tablet, Rfl: 1    fexofenadine (ALLEGRA) 180 MG tablet, Take 1 tablet by mouth as needed  , Disp: , Rfl:     Magnesium Oxide 500 MG (LAX) TABS, Take 1 tablet by mouth daily, Disp: , Rfl:     SUMAtriptan (IMITREX) 50 mg tablet, Take 1 tablet by mouth as needed  , Disp: , Rfl:     dicyclomine (BENTYL) 20 mg tablet, Take 1 tablet (20 mg total) by mouth 3 (three) times a day as needed (for abdominal cramping) for up to 10 days, Disp: 30 tablet, Rfl: 0         Objective:         Vitals:    02/10/20 0821   BP: 130/86   BP Location: Left arm Patient Position: Sitting   Cuff Size: Adult   Pulse: 73   Resp: 17   Temp: (!) 97 3 °F (36 3 °C)   TempSrc: Tympanic   SpO2: 97%   Weight: 88 5 kg (195 lb)   Height: 5' 9 5" (1 765 m)     Physical Exam   Constitutional: He is oriented to person, place, and time  He appears well-developed and well-nourished  HENT:   Head: Normocephalic and atraumatic  Nose: Nose normal    Mouth/Throat: No oropharyngeal exudate  Eyes: Pupils are equal, round, and reactive to light  Right eye exhibits no discharge  Left eye exhibits no discharge  Neck: Normal range of motion  Neck supple  No tracheal deviation present  Cardiovascular: Normal rate, regular rhythm and intact distal pulses  Exam reveals no gallop and no friction rub  No murmur heard  Pulses:       Dorsalis pedis pulses are 2+ on the right side, and 2+ on the left side  Posterior tibial pulses are 2+ on the right side, and 2+ on the left side  Pulmonary/Chest: Effort normal and breath sounds normal  No respiratory distress  He has no wheezes  He has no rales  Abdominal: Soft  Bowel sounds are normal  He exhibits no distension  There is no tenderness  There is no rebound and no guarding  Musculoskeletal: Normal range of motion  He exhibits no edema  Lymphadenopathy:        Head (right side): No submental and no submandibular adenopathy present  Head (left side): No submental and no submandibular adenopathy present  He has no cervical adenopathy  Right cervical: No superficial cervical, no deep cervical and no posterior cervical adenopathy present  Left cervical: No superficial cervical, no deep cervical and no posterior cervical adenopathy present  Neurological: He is alert and oriented to person, place, and time  No cranial nerve deficit or sensory deficit  Skin: Skin is warm, dry and intact  Psychiatric: His speech is normal and behavior is normal  Judgment normal  His mood appears not anxious   Cognition and memory are normal  He does not exhibit a depressed mood  Vitals reviewed  Never smoker

## 2020-09-02 DIAGNOSIS — I10 BENIGN ESSENTIAL HYPERTENSION: ICD-10-CM

## 2020-09-03 RX ORDER — HYDROCHLOROTHIAZIDE 12.5 MG/1
12.5 CAPSULE, GELATIN COATED ORAL DAILY
Qty: 90 CAPSULE | Refills: 0 | Status: SHIPPED | OUTPATIENT
Start: 2020-09-03 | End: 2020-09-10 | Stop reason: SDUPTHER

## 2020-09-03 RX ORDER — AMLODIPINE BESYLATE 5 MG/1
5 TABLET ORAL DAILY
Qty: 90 TABLET | Refills: 0 | Status: SHIPPED | OUTPATIENT
Start: 2020-09-03 | End: 2020-09-10 | Stop reason: SDUPTHER

## 2020-09-10 ENCOUNTER — OFFICE VISIT (OUTPATIENT)
Dept: FAMILY MEDICINE CLINIC | Facility: CLINIC | Age: 30
End: 2020-09-10
Payer: COMMERCIAL

## 2020-09-10 VITALS
HEIGHT: 70 IN | BODY MASS INDEX: 27.49 KG/M2 | SYSTOLIC BLOOD PRESSURE: 120 MMHG | TEMPERATURE: 97.8 F | OXYGEN SATURATION: 97 % | HEART RATE: 70 BPM | RESPIRATION RATE: 16 BRPM | WEIGHT: 192 LBS | DIASTOLIC BLOOD PRESSURE: 80 MMHG

## 2020-09-10 DIAGNOSIS — I10 BENIGN ESSENTIAL HYPERTENSION: Primary | ICD-10-CM

## 2020-09-10 DIAGNOSIS — G43.009 ATYPICAL MIGRAINE: ICD-10-CM

## 2020-09-10 PROBLEM — R07.89 CHEST DISCOMFORT: Status: RESOLVED | Noted: 2018-01-25 | Resolved: 2020-09-10

## 2020-09-10 PROCEDURE — 99213 OFFICE O/P EST LOW 20 MIN: CPT | Performed by: FAMILY MEDICINE

## 2020-09-10 RX ORDER — AMLODIPINE BESYLATE 5 MG/1
5 TABLET ORAL DAILY
Qty: 90 TABLET | Refills: 1 | Status: SHIPPED | OUTPATIENT
Start: 2020-09-10 | End: 2020-12-06 | Stop reason: SDUPTHER

## 2020-09-10 RX ORDER — HYDROCHLOROTHIAZIDE 12.5 MG/1
12.5 CAPSULE, GELATIN COATED ORAL DAILY
Qty: 90 CAPSULE | Refills: 1 | Status: SHIPPED | OUTPATIENT
Start: 2020-09-10 | End: 2020-12-06 | Stop reason: SDUPTHER

## 2020-09-10 NOTE — PROGRESS NOTES
Assessment/Plan:   1  Benign essential hypertension  Blister patient's blood pressures have been very well controlled  At this time, continue with routine home monitoring  Continue as well with his current treatment of amlodipine as well as his hydrochlorothiazide  - amLODIPine (NORVASC) 5 mg tablet; Take 1 tablet (5 mg total) by mouth daily  Dispense: 90 tablet; Refill: 1  - hydrochlorothiazide (MICROZIDE) 12 5 mg capsule; Take 1 capsule (12 5 mg total) by mouth daily  Dispense: 90 capsule; Refill: 1    2  Atypical migraine  Patient denies any recent exacerbations of his headaches  He states that the sumatriptan has been effective for him  Will continue with his current treatment  Follow up with patient in 6 months  There are no diagnoses linked to this encounter  Subjective:       Chief Complaint   Patient presents with    Follow-up     6 month       Patient ID: Chacho Farias is a 31 y o  male  Patient is a 22-year-old male presents today for follow-up on his chronic conditions  He has hypertension as well as atypical migraines  Since last visit, he states that his symptoms have been much better controlled  He does check blood pressures have been stable  He does exercise regularly  Review of Systems   Constitutional: Negative for activity change, chills, fatigue and fever  HENT: Negative for congestion, ear pain, sinus pressure and sore throat  Eyes: Negative for redness, itching and visual disturbance  Respiratory: Negative for cough and shortness of breath  Cardiovascular: Negative for chest pain and palpitations  Gastrointestinal: Negative for abdominal pain, diarrhea and nausea  Endocrine: Negative for cold intolerance and heat intolerance  Genitourinary: Negative for dysuria, flank pain and frequency  Musculoskeletal: Negative for arthralgias, back pain, gait problem and myalgias  Skin: Negative for color change     Allergic/Immunologic: Negative for environmental allergies  Neurological: Negative for dizziness, numbness and headaches  Psychiatric/Behavioral: Negative for behavioral problems and sleep disturbance  The following portions of the patient's history were reviewed and updated as appropriate : past family history, past medical history, past social history and past surgical history  Current Outpatient Medications:     amLODIPine (NORVASC) 5 mg tablet, Take 1 tablet (5 mg total) by mouth daily, Disp: 90 tablet, Rfl: 0    fexofenadine (ALLEGRA) 180 MG tablet, Take 1 tablet by mouth as needed  , Disp: , Rfl:     hydrochlorothiazide (MICROZIDE) 12 5 mg capsule, Take 1 capsule (12 5 mg total) by mouth daily, Disp: 90 capsule, Rfl: 0    Magnesium Oxide 500 MG (LAX) TABS, Take 1 tablet by mouth daily, Disp: , Rfl:     SUMAtriptan (IMITREX) 50 mg tablet, Take 1 tablet by mouth as needed  , Disp: , Rfl:     dicyclomine (BENTYL) 20 mg tablet, Take 1 tablet (20 mg total) by mouth 3 (three) times a day as needed (for abdominal cramping) for up to 10 days, Disp: 30 tablet, Rfl: 0         Objective:         Vitals:    09/10/20 1212   BP: 120/80   BP Location: Left arm   Patient Position: Sitting   Cuff Size: Large   Pulse: 70   Resp: 16   Temp: 97 8 °F (36 6 °C)   TempSrc: Temporal   SpO2: 97%   Weight: 87 1 kg (192 lb)   Height: 5' 9 5" (1 765 m)     Physical Exam  Vitals signs reviewed  Constitutional:       Appearance: He is well-developed  HENT:      Head: Normocephalic and atraumatic  Nose: Nose normal       Mouth/Throat:      Pharynx: No oropharyngeal exudate  Eyes:      General: No scleral icterus  Right eye: No discharge  Left eye: No discharge  Pupils: Pupils are equal, round, and reactive to light  Neck:      Musculoskeletal: Normal range of motion and neck supple  Trachea: No tracheal deviation  Cardiovascular:      Rate and Rhythm: Normal rate and regular rhythm        Pulses:           Dorsalis pedis pulses are 2+ on the right side and 2+ on the left side  Posterior tibial pulses are 2+ on the right side and 2+ on the left side  Heart sounds: Normal heart sounds  No murmur  No friction rub  No gallop  Pulmonary:      Effort: Pulmonary effort is normal  No respiratory distress  Breath sounds: Normal breath sounds  No wheezing or rales  Abdominal:      General: Bowel sounds are normal  There is no distension  Palpations: Abdomen is soft  Tenderness: There is no abdominal tenderness  There is no guarding or rebound  Musculoskeletal: Normal range of motion  Lymphadenopathy:      Head:      Right side of head: No submental or submandibular adenopathy  Left side of head: No submental or submandibular adenopathy  Cervical: No cervical adenopathy  Right cervical: No superficial, deep or posterior cervical adenopathy  Left cervical: No superficial, deep or posterior cervical adenopathy  Skin:     General: Skin is warm and dry  Findings: No erythema  Neurological:      Mental Status: He is alert and oriented to person, place, and time  Cranial Nerves: No cranial nerve deficit  Sensory: No sensory deficit  Psychiatric:         Mood and Affect: Mood is not anxious or depressed  Speech: Speech normal          Behavior: Behavior normal          Thought Content:  Thought content normal          Judgment: Judgment normal

## 2020-12-06 DIAGNOSIS — I10 BENIGN ESSENTIAL HYPERTENSION: ICD-10-CM

## 2020-12-07 RX ORDER — HYDROCHLOROTHIAZIDE 12.5 MG/1
12.5 CAPSULE, GELATIN COATED ORAL DAILY
Qty: 90 CAPSULE | Refills: 0 | Status: SHIPPED | OUTPATIENT
Start: 2020-12-07 | End: 2021-03-30 | Stop reason: SDUPTHER

## 2020-12-07 RX ORDER — AMLODIPINE BESYLATE 5 MG/1
5 TABLET ORAL DAILY
Qty: 90 TABLET | Refills: 0 | Status: SHIPPED | OUTPATIENT
Start: 2020-12-07 | End: 2021-03-30 | Stop reason: SDUPTHER

## 2020-12-23 ENCOUNTER — IMMUNIZATIONS (OUTPATIENT)
Dept: FAMILY MEDICINE CLINIC | Facility: HOSPITAL | Age: 30
End: 2020-12-23
Payer: COMMERCIAL

## 2020-12-23 DIAGNOSIS — Z23 ENCOUNTER FOR IMMUNIZATION: ICD-10-CM

## 2020-12-23 PROCEDURE — 91300 SARS-COV-2 / COVID-19 MRNA VACCINE (PFIZER-BIONTECH) 30 MCG: CPT

## 2020-12-23 PROCEDURE — 0001A SARS-COV-2 / COVID-19 MRNA VACCINE (PFIZER-BIONTECH) 30 MCG: CPT

## 2021-01-14 ENCOUNTER — IMMUNIZATIONS (OUTPATIENT)
Dept: FAMILY MEDICINE CLINIC | Facility: HOSPITAL | Age: 31
End: 2021-01-14

## 2021-01-14 DIAGNOSIS — Z23 ENCOUNTER FOR IMMUNIZATION: Primary | ICD-10-CM

## 2021-01-14 PROCEDURE — 0002A SARS-COV-2 / COVID-19 MRNA VACCINE (PFIZER-BIONTECH) 30 MCG: CPT

## 2021-01-14 PROCEDURE — 91300 SARS-COV-2 / COVID-19 MRNA VACCINE (PFIZER-BIONTECH) 30 MCG: CPT

## 2021-02-23 ENCOUNTER — TELEPHONE (OUTPATIENT)
Dept: BEHAVIORAL/MENTAL HEALTH CLINIC | Facility: CLINIC | Age: 31
End: 2021-02-23

## 2021-02-23 NOTE — TELEPHONE ENCOUNTER
Patient called and would like to set up an apt he has a referral on file he said the best time to reach him is at 12:00 any day of the week   Thank you

## 2021-03-03 ENCOUNTER — OFFICE VISIT (OUTPATIENT)
Dept: FAMILY MEDICINE CLINIC | Facility: CLINIC | Age: 31
End: 2021-03-03
Payer: COMMERCIAL

## 2021-03-03 VITALS
OXYGEN SATURATION: 97 % | RESPIRATION RATE: 17 BRPM | HEIGHT: 70 IN | DIASTOLIC BLOOD PRESSURE: 86 MMHG | SYSTOLIC BLOOD PRESSURE: 126 MMHG | WEIGHT: 195.8 LBS | HEART RATE: 65 BPM | BODY MASS INDEX: 28.03 KG/M2 | TEMPERATURE: 97.3 F

## 2021-03-03 DIAGNOSIS — Z13.1 SCREENING FOR DIABETES MELLITUS: ICD-10-CM

## 2021-03-03 DIAGNOSIS — I10 BENIGN ESSENTIAL HYPERTENSION: ICD-10-CM

## 2021-03-03 DIAGNOSIS — H81.13 BENIGN PAROXYSMAL POSITIONAL VERTIGO DUE TO BILATERAL VESTIBULAR DISORDER: Primary | ICD-10-CM

## 2021-03-03 DIAGNOSIS — R17 ELEVATED BILIRUBIN: ICD-10-CM

## 2021-03-03 DIAGNOSIS — Z13.0 SCREENING FOR IRON DEFICIENCY ANEMIA: ICD-10-CM

## 2021-03-03 DIAGNOSIS — Z13.220 SCREENING FOR CHOLESTEROL LEVEL: ICD-10-CM

## 2021-03-03 DIAGNOSIS — Z13.29 SCREENING FOR THYROID DISORDER: ICD-10-CM

## 2021-03-03 DIAGNOSIS — R00.2 PALPITATION: ICD-10-CM

## 2021-03-03 PROCEDURE — 99214 OFFICE O/P EST MOD 30 MIN: CPT | Performed by: FAMILY MEDICINE

## 2021-03-03 RX ORDER — ACETAMINOPHEN 160 MG
2000 TABLET,DISINTEGRATING ORAL DAILY
COMMUNITY

## 2021-03-03 NOTE — PROGRESS NOTES
Assessment/Plan:   1  Benign paroxysmal positional vertigo due to bilateral vestibular disorder   reviewed patient's symptoms today  At this time, symptoms appear likely secondary to benign paroxysmal positional vertigo  He was educated on pathophysiology is problem  At this time, was advised he would highly benefit from the Epley maneuver  He was given instructions today  He has been taking meclizine p r n  at home for symptom relief  This has been causing mild drowsiness  If symptoms not improving in 1-2 weeks, will consider further evaluation with Ophthalmology  2  Benign essential hypertension    Blood pressure appears stable today  At this time, with routine home monitoring  Continue as well with current treatment of amlodipine as well as his hydrochlorothiazide  Will recheck blood work prior to his next visit in the summer  Follow-up at that time      BMI Counseling: Body mass index is 28 5 kg/m²  The BMI is above normal  Nutrition recommendations include decreasing portion sizes, encouraging healthy choices of fruits and vegetables, decreasing fast food intake, consuming healthier snacks and limiting drinks that contain sugar  Exercise recommendations include moderate physical activity 150 minutes/week and exercising 3-5 times per week  No pharmacotherapy was ordered  There are no diagnoses linked to this encounter  Subjective:       Chief Complaint   Patient presents with    Dizziness     started Sunday     Nausea      Patient ID: Melissa Aguiar is a 31 y o  male    Dizziness  This is a new problem  Episode onset: 4 days ago  The problem occurs intermittently  The problem has been unchanged  Associated symptoms include fatigue, nausea, vertigo and a visual change  Pertinent negatives include no abdominal pain, arthralgias, chest pain, chills, congestion, coughing, fever, headaches, myalgias, numbness, sore throat, swollen glands, urinary symptoms or weakness   The symptoms are aggravated by twisting and walking  He has tried nothing for the symptoms  Review of Systems   Constitutional: Positive for fatigue  Negative for activity change, chills and fever  HENT: Negative for congestion, ear pain, sinus pressure and sore throat  Eyes: Negative for redness, itching and visual disturbance  Respiratory: Negative for cough and shortness of breath  Cardiovascular: Negative for chest pain and palpitations  Gastrointestinal: Positive for nausea  Negative for abdominal pain and diarrhea  Endocrine: Negative for cold intolerance and heat intolerance  Genitourinary: Negative for dysuria, flank pain and frequency  Musculoskeletal: Negative for arthralgias, back pain, gait problem and myalgias  Skin: Negative for color change  Allergic/Immunologic: Negative for environmental allergies  Neurological: Positive for dizziness and vertigo  Negative for weakness, numbness and headaches  Psychiatric/Behavioral: Negative for behavioral problems and sleep disturbance  The following portions of the patient's history were reviewed and updated as appropriate : past family history, past medical history, past social history and past surgical history      Current Outpatient Medications:     amLODIPine (NORVASC) 5 mg tablet, Take 1 tablet (5 mg total) by mouth daily, Disp: 90 tablet, Rfl: 0    Cholecalciferol (Vitamin D3) 50 MCG (2000 UT) capsule, Take 2,000 Units by mouth daily, Disp: , Rfl:     fexofenadine (ALLEGRA) 180 MG tablet, Take 1 tablet by mouth as needed  , Disp: , Rfl:     hydrochlorothiazide (MICROZIDE) 12 5 mg capsule, Take 1 capsule (12 5 mg total) by mouth daily, Disp: 90 capsule, Rfl: 0    Magnesium Oxide 500 MG (LAX) TABS, Take 1 tablet by mouth daily, Disp: , Rfl:     SUMAtriptan (IMITREX) 50 mg tablet, Take 1 tablet by mouth as needed  , Disp: , Rfl:     dicyclomine (BENTYL) 20 mg tablet, Take 1 tablet (20 mg total) by mouth 3 (three) times a day as needed (for abdominal cramping) for up to 10 days, Disp: 30 tablet, Rfl: 0         Objective:         Vitals:    03/03/21 1046   BP: 126/86   BP Location: Left arm   Patient Position: Sitting   Cuff Size: Adult   Pulse: 65   Resp: 17   Temp: (!) 97 3 °F (36 3 °C)   TempSrc: Tympanic   SpO2: 97%   Weight: 88 8 kg (195 lb 12 8 oz)   Height: 5' 9 5" (1 765 m)     Physical Exam  Vitals signs reviewed  Constitutional:       Appearance: He is well-developed  HENT:      Head: Normocephalic and atraumatic  Nose: Nose normal       Mouth/Throat:      Pharynx: No oropharyngeal exudate  Eyes:      General: No scleral icterus  Right eye: No discharge  Left eye: No discharge  Pupils: Pupils are equal, round, and reactive to light  Neck:      Musculoskeletal: Normal range of motion and neck supple  Trachea: No tracheal deviation  Cardiovascular:      Rate and Rhythm: Normal rate and regular rhythm  Pulses:           Dorsalis pedis pulses are 2+ on the right side and 2+ on the left side  Posterior tibial pulses are 2+ on the right side and 2+ on the left side  Heart sounds: Normal heart sounds  No murmur  No friction rub  No gallop  Pulmonary:      Effort: Pulmonary effort is normal  No respiratory distress  Breath sounds: Normal breath sounds  No wheezing or rales  Abdominal:      General: Bowel sounds are normal  There is no distension  Palpations: Abdomen is soft  Tenderness: There is no abdominal tenderness  There is no guarding or rebound  Musculoskeletal: Normal range of motion  Lymphadenopathy:      Head:      Right side of head: No submental or submandibular adenopathy  Left side of head: No submental or submandibular adenopathy  Cervical: No cervical adenopathy  Right cervical: No superficial, deep or posterior cervical adenopathy  Left cervical: No superficial, deep or posterior cervical adenopathy     Skin:     General: Skin is warm and dry  Findings: No erythema  Neurological:      Mental Status: He is alert and oriented to person, place, and time  Cranial Nerves: No cranial nerve deficit  Sensory: No sensory deficit  Psychiatric:         Mood and Affect: Mood is not anxious or depressed  Speech: Speech normal          Behavior: Behavior normal          Thought Content:  Thought content normal          Judgment: Judgment normal

## 2021-03-30 ENCOUNTER — PATIENT MESSAGE (OUTPATIENT)
Dept: FAMILY MEDICINE CLINIC | Facility: CLINIC | Age: 31
End: 2021-03-30

## 2021-03-30 DIAGNOSIS — I10 BENIGN ESSENTIAL HYPERTENSION: ICD-10-CM

## 2021-03-31 ENCOUNTER — TELEPHONE (OUTPATIENT)
Dept: FAMILY MEDICINE CLINIC | Facility: CLINIC | Age: 31
End: 2021-03-31

## 2021-03-31 RX ORDER — HYDROCHLOROTHIAZIDE 12.5 MG/1
12.5 CAPSULE, GELATIN COATED ORAL DAILY
Qty: 90 CAPSULE | Refills: 0 | Status: SHIPPED | OUTPATIENT
Start: 2021-03-31 | End: 2021-09-07

## 2021-03-31 RX ORDER — AMLODIPINE BESYLATE 5 MG/1
5 TABLET ORAL DAILY
Qty: 90 TABLET | Refills: 0 | Status: SHIPPED | OUTPATIENT
Start: 2021-03-31 | End: 2021-10-19 | Stop reason: SDUPTHER

## 2021-03-31 NOTE — TELEPHONE ENCOUNTER
----- Message from Sixto Omer DO sent at 3/31/2021  7:59 AM EDT -----  Regarding: FW: Non-Urgent Medical Question  Contact: [phone]  Please call patient and schedule him for a visit concerning depression  Thank you  ----- Message -----  From: Manas Gonzáles LPN  Sent: 6/22/4436   7:40 AM EDT  To: Sixto Omer DO  Subject: FW: Non-Urgent Medical Question                    ----- Message -----  From: Kerry Page  Sent: 3/30/2021   7:45 PM EDT  To: [facility]  Subject: Non-Urgent Medical Question                      Hey there Dr Vaishnavi Cadena,    Two things: 1 I would like a refill to my blood pressure prescriptions and if it could be sent to the [facility] [address] [place], [address], that would be great  2, I would also like to discuss potentially being put on some type of anti-depressant  I was waiting for my psych referral but the waiting list looks to be 6-9 months

## 2021-06-01 ENCOUNTER — TELEMEDICINE (OUTPATIENT)
Dept: FAMILY MEDICINE CLINIC | Facility: CLINIC | Age: 31
End: 2021-06-01
Payer: COMMERCIAL

## 2021-06-01 DIAGNOSIS — J01.90 ACUTE SINUSITIS, RECURRENCE NOT SPECIFIED, UNSPECIFIED LOCATION: Primary | ICD-10-CM

## 2021-06-01 PROCEDURE — 99214 OFFICE O/P EST MOD 30 MIN: CPT | Performed by: FAMILY MEDICINE

## 2021-06-01 RX ORDER — AMOXICILLIN AND CLAVULANATE POTASSIUM 875; 125 MG/1; MG/1
1 TABLET, FILM COATED ORAL EVERY 12 HOURS SCHEDULED
Qty: 14 TABLET | Refills: 0 | Status: SHIPPED | OUTPATIENT
Start: 2021-06-01 | End: 2021-06-08

## 2021-06-01 NOTE — PROGRESS NOTES
Virtual Regular Visit      Assessment/Plan:  1  Acute sinusitis, recurrence not specified, unspecified location   start supportive care  Maintain hydration  Take over-the-counter Mucinex  Will start treatment with fluticasone nasal spray as well as Augmentin  Follow-up if any symptoms persist   - amoxicillin-clavulanate (AUGMENTIN) 875-125 mg per tablet; Take 1 tablet by mouth every 12 (twelve) hours for 7 days  Dispense: 14 tablet; Refill: 0      I have spent 8 minutes with Patient  today in which greater than 50% of this time was spent in counseling/coordination of care regarding Prognosis, Risks and benefits of tx options, Intructions for management, Patient and family education and Importance of tx compliance  Problem List Items Addressed This Visit     None      Visit Diagnoses     Acute sinusitis, recurrence not specified, unspecified location    -  Primary    Relevant Medications    amoxicillin-clavulanate (AUGMENTIN) 875-125 mg per tablet               Reason for visit is   Chief Complaint   Patient presents with    Virtual Regular Visit        Encounter provider Stefanie Benites DO    Provider located at [place] [address]  [address] [facility] [address] [place] [address]  [phone]      Recent Visits  No visits were found meeting these conditions  Showing recent visits within past 7 days and meeting all other requirements     Today's Visits  Date Type Provider Dept   06/01/21 Telemedicine Emery Ryan DO [facility]   Showing today's visits and meeting all other requirements     Future Appointments  No visits were found meeting these conditions  Showing future appointments within next 150 days and meeting all other requirements        The patient was identified by name and date of birth   Rafitanereidaabigail Smith was informed that this is a telemedicine visit and that the visit is being conducted through [address] [place] [address] Now and patient was informed that this is a secure, HIPAA-compliant platform  He agrees to proceed     My office door was closed  No one else was in the room  He acknowledged consent and understanding of privacy and security of the video platform  The patient has agreed to participate and understands they can discontinue the visit at any time  Patient is aware this is a billable service  Anselmo Louise is a 31 y o  male   Presents today for his virtual visit   Sinusitis  This is a new problem  The current episode started in the past 7 days  The problem is unchanged  There has been no fever  Associated symptoms include congestion, coughing, ear pain, a hoarse voice, sinus pressure and a sore throat  Pertinent negatives include no chills, headaches or shortness of breath  Treatments tried: Allegra  The treatment provided no relief  Past Medical History:   Diagnosis Date    Benign essential hypertension 9/10/2020       Past Surgical History:   Procedure Laterality Date    ADENOIDECTOMY      FACIAL COSMETIC SURGERY      TONSILLECTOMY         Current Outpatient Medications   Medication Sig Dispense Refill    amLODIPine (NORVASC) 5 mg tablet Take 1 tablet (5 mg total) by mouth daily 90 tablet 0    amoxicillin-clavulanate (AUGMENTIN) 875-125 mg per tablet Take 1 tablet by mouth every 12 (twelve) hours for 7 days 14 tablet 0    Cholecalciferol (Vitamin D3) 50 MCG (2000 UT) capsule Take 2,000 Units by mouth daily      dicyclomine (BENTYL) 20 mg tablet Take 1 tablet (20 mg total) by mouth 3 (three) times a day as needed (for abdominal cramping) for up to 10 days 30 tablet 0    fexofenadine (ALLEGRA) 180 MG tablet Take 1 tablet by mouth as needed        hydrochlorothiazide (MICROZIDE) 12 5 mg capsule Take 1 capsule (12 5 mg total) by mouth daily 90 capsule 0    Magnesium Oxide 500 MG (LAX) TABS Take 1 tablet by mouth daily       No current facility-administered medications for this visit           Allergies   Allergen Reactions  Other      Seasonal       Review of Systems   Constitutional: Negative for activity change, chills, fatigue and fever  HENT: Positive for congestion, ear pain, hoarse voice, sinus pressure and sore throat  Eyes: Negative for redness, itching and visual disturbance  Respiratory: Positive for cough  Negative for shortness of breath  Cardiovascular: Negative for chest pain and palpitations  Gastrointestinal: Negative for abdominal pain, diarrhea and nausea  Endocrine: Negative for cold intolerance and heat intolerance  Genitourinary: Negative for dysuria, flank pain and frequency  Musculoskeletal: Negative for arthralgias, back pain, gait problem and myalgias  Skin: Negative for color change  Allergic/Immunologic: Negative for environmental allergies  Neurological: Negative for dizziness, numbness and headaches  Psychiatric/Behavioral: Negative for behavioral problems and sleep disturbance  Video Exam    There were no vitals filed for this visit  Physical Exam  Constitutional:       General: He is not in acute distress  Appearance: He is well-developed  He is not diaphoretic  HENT:      Head: Normocephalic and atraumatic  Not macrocephalic and not microcephalic  Right Ear: Hearing normal       Left Ear: Hearing normal       Nose: Nose normal    Eyes:      General: Lids are normal       Conjunctiva/sclera: Conjunctivae normal    Neck:      Musculoskeletal: Full passive range of motion without pain  Pulmonary:      Effort: Pulmonary effort is normal  No respiratory distress  Skin:     General: Skin is dry  Neurological:      Mental Status: He is alert  He is not disoriented  GCS: GCS eye subscore is 4  GCS verbal subscore is 5  GCS motor subscore is 6  Cranial Nerves: No cranial nerve deficit  Psychiatric:         Speech: Speech normal          Behavior: Behavior normal          Thought Content:  Thought content normal          Judgment: Judgment normal  VIRTUAL VISIT DISCLAIMER    Jose Hernadez acknowledges that he has consented to an online visit or consultation  He understands that the online visit is based solely on information provided by him, and that, in the absence of a face-to-face physical evaluation by the physician, the diagnosis he receives is both limited and provisional in terms of accuracy and completeness  This is not intended to replace a full medical face-to-face evaluation by the physician  Jose Hernadez understands and accepts these terms

## 2021-06-01 NOTE — LETTER
June 1, 2021     Patient: Luciana Hung   YOB: 1990   Date of Visit: 6/1/2021       To Whom it May Concern:    Eripatrick Rosalba is under my professional care  He was seen in my office on 6/1/2021  He may return to work on 6/3/21  If you have any questions or concerns, please don't hesitate to call           Sincerely,          Emery Ryan DO        CC: No Recipients

## 2021-09-07 ENCOUNTER — OFFICE VISIT (OUTPATIENT)
Dept: FAMILY MEDICINE CLINIC | Facility: CLINIC | Age: 31
End: 2021-09-07
Payer: COMMERCIAL

## 2021-09-07 VITALS
TEMPERATURE: 98.3 F | DIASTOLIC BLOOD PRESSURE: 80 MMHG | HEART RATE: 75 BPM | SYSTOLIC BLOOD PRESSURE: 128 MMHG | HEIGHT: 70 IN | RESPIRATION RATE: 17 BRPM | BODY MASS INDEX: 28.63 KG/M2 | OXYGEN SATURATION: 97 % | WEIGHT: 200 LBS

## 2021-09-07 DIAGNOSIS — Z13.1 SCREENING FOR DIABETES MELLITUS: ICD-10-CM

## 2021-09-07 DIAGNOSIS — Z13.29 SCREENING FOR THYROID DISORDER: ICD-10-CM

## 2021-09-07 DIAGNOSIS — Z23 NEED FOR MMR VACCINE: ICD-10-CM

## 2021-09-07 DIAGNOSIS — R17 ELEVATED BILIRUBIN: ICD-10-CM

## 2021-09-07 DIAGNOSIS — I10 BENIGN ESSENTIAL HYPERTENSION: Primary | ICD-10-CM

## 2021-09-07 DIAGNOSIS — Z13.220 SCREENING FOR CHOLESTEROL LEVEL: ICD-10-CM

## 2021-09-07 DIAGNOSIS — Z13.0 SCREENING FOR IRON DEFICIENCY ANEMIA: ICD-10-CM

## 2021-09-07 PROCEDURE — 3725F SCREEN DEPRESSION PERFORMED: CPT | Performed by: FAMILY MEDICINE

## 2021-09-07 PROCEDURE — 90707 MMR VACCINE SC: CPT | Performed by: FAMILY MEDICINE

## 2021-09-07 PROCEDURE — 1036F TOBACCO NON-USER: CPT | Performed by: FAMILY MEDICINE

## 2021-09-07 PROCEDURE — 90471 IMMUNIZATION ADMIN: CPT | Performed by: FAMILY MEDICINE

## 2021-09-07 PROCEDURE — 3008F BODY MASS INDEX DOCD: CPT | Performed by: FAMILY MEDICINE

## 2021-09-07 PROCEDURE — 99214 OFFICE O/P EST MOD 30 MIN: CPT | Performed by: FAMILY MEDICINE

## 2021-09-07 NOTE — PROGRESS NOTES
Assessment/Plan:   1  Benign essential hypertension    Patient's blood pressure appears stable  Given his home low blood pressure readings, will continue with his current treatment of  Amlodipine  Will discontinue his hydrochlorothiazide  Continue with a strict diet and exercise plan  - Comprehensive metabolic panel; Future  - Bilirubin, direct; Future    2  Elevated bilirubin  Bilirubin appears stable  Continue with routine home  Follow-up patient 6 months   Or p r n  [place] Swenson BMI Counseling: Body mass index is 29 11 kg/m²  The BMI is above normal  Nutrition recommendations include decreasing portion sizes, encouraging healthy choices of fruits and vegetables, decreasing fast food intake, consuming healthier snacks and limiting drinks that contain sugar  Exercise recommendations include moderate physical activity 150 minutes/week and exercising 3-5 times per week  No pharmacotherapy was ordered  Patient referred to PCP due to patient being overweight  Depression Screening and Follow-up Plan: Patient's depression screening was positive with a PHQ-2 score of 0  Clincally patient does not have depression  No treatment is required  There are no diagnoses linked to this encounter  Subjective:       Chief Complaint   Patient presents with    Follow-up     6 month       Patient ID: Arpan Obregon is a 36 y o  male  Presents today for a follow-up on his chronic conditions  He has hypertension as well as elevated bilirubin  He has been taking his medications regularly  He denies adverse reactions with medications  He has been noticing low blood pressure readings at home  He would like to see if he needs to continue taking his blood pressure medications  HPI    Review of Systems   Constitutional: Negative for activity change, chills, fatigue and fever  HENT: Negative for congestion, ear pain, sinus pressure and sore throat  Eyes: Negative for redness, itching and visual disturbance  Respiratory: Negative for cough and shortness of breath  Cardiovascular: Negative for chest pain and palpitations  Gastrointestinal: Negative for abdominal pain, diarrhea and nausea  Endocrine: Negative for cold intolerance and heat intolerance  Genitourinary: Negative for dysuria, flank pain and frequency  Musculoskeletal: Negative for arthralgias, back pain, gait problem and myalgias  Skin: Negative for color change  Allergic/Immunologic: Negative for environmental allergies  Neurological: Negative for dizziness, numbness and headaches  Psychiatric/Behavioral: Negative for behavioral problems and sleep disturbance  The following portions of the patient's history were reviewed and updated as appropriate : past family history, past medical history, past social history and past surgical history  Current Outpatient Medications:     amLODIPine (NORVASC) 5 mg tablet, Take 1 tablet (5 mg total) by mouth daily, Disp: 90 tablet, Rfl: 0    Cholecalciferol (Vitamin D3) 50 MCG (2000 UT) capsule, Take 2,000 Units by mouth daily, Disp: , Rfl:     fexofenadine (ALLEGRA) 180 MG tablet, Take 1 tablet by mouth as needed  , Disp: , Rfl:     hydrochlorothiazide (MICROZIDE) 12 5 mg capsule, Take 1 capsule (12 5 mg total) by mouth daily, Disp: 90 capsule, Rfl: 0    Magnesium Oxide 500 MG (LAX) TABS, Take 1 tablet by mouth daily, Disp: , Rfl:     dicyclomine (BENTYL) 20 mg tablet, Take 1 tablet (20 mg total) by mouth 3 (three) times a day as needed (for abdominal cramping) for up to 10 days, Disp: 30 tablet, Rfl: 0         Objective:         Vitals:    09/07/21 1041   BP: 128/80   BP Location: Left arm   Patient Position: Sitting   Cuff Size: Adult   Pulse: 75   Resp: 17   Temp: 98 3 °F (36 8 °C)   TempSrc: Tympanic   SpO2: 97%   Weight: 90 7 kg (200 lb)   Height: 5' 9 5" (1 765 m)     Physical Exam  Vitals reviewed  Constitutional:       Appearance: He is well-developed     HENT:      Head: Normocephalic and atraumatic  Nose: Nose normal       Mouth/Throat:      Pharynx: No oropharyngeal exudate  Eyes:      General: No scleral icterus  Right eye: No discharge  Left eye: No discharge  Pupils: Pupils are equal, round, and reactive to light  Neck:      Trachea: No tracheal deviation  Cardiovascular:      Rate and Rhythm: Normal rate and regular rhythm  Pulses:           Dorsalis pedis pulses are 2+ on the right side and 2+ on the left side  Posterior tibial pulses are 2+ on the right side and 2+ on the left side  Heart sounds: Normal heart sounds  No murmur heard  No friction rub  No gallop  Pulmonary:      Effort: Pulmonary effort is normal  No respiratory distress  Breath sounds: Normal breath sounds  No wheezing or rales  Abdominal:      General: Bowel sounds are normal  There is no distension  Palpations: Abdomen is soft  Tenderness: There is no abdominal tenderness  There is no guarding or rebound  Musculoskeletal:         General: Normal range of motion  Cervical back: Normal range of motion and neck supple  Lymphadenopathy:      Head:      Right side of head: No submental or submandibular adenopathy  Left side of head: No submental or submandibular adenopathy  Cervical: No cervical adenopathy  Right cervical: No superficial, deep or posterior cervical adenopathy  Left cervical: No superficial, deep or posterior cervical adenopathy  Skin:     General: Skin is warm and dry  Findings: No erythema  Neurological:      Mental Status: He is alert and oriented to person, place, and time  Cranial Nerves: No cranial nerve deficit  Sensory: No sensory deficit  Psychiatric:         Mood and Affect: Mood is not anxious or depressed  Speech: Speech normal          Behavior: Behavior normal          Thought Content:  Thought content normal          Judgment: Judgment normal

## 2021-10-19 ENCOUNTER — TELEMEDICINE (OUTPATIENT)
Dept: FAMILY MEDICINE CLINIC | Facility: CLINIC | Age: 31
End: 2021-10-19
Payer: COMMERCIAL

## 2021-10-19 DIAGNOSIS — I10 BENIGN ESSENTIAL HYPERTENSION: ICD-10-CM

## 2021-10-19 DIAGNOSIS — N46.9 INFERTILITY MALE: Primary | ICD-10-CM

## 2021-10-19 PROCEDURE — 99214 OFFICE O/P EST MOD 30 MIN: CPT | Performed by: FAMILY MEDICINE

## 2021-10-19 PROCEDURE — 1036F TOBACCO NON-USER: CPT | Performed by: FAMILY MEDICINE

## 2021-10-19 RX ORDER — AMLODIPINE BESYLATE 5 MG/1
5 TABLET ORAL DAILY
Qty: 90 TABLET | Refills: 1 | Status: SHIPPED | OUTPATIENT
Start: 2021-10-19

## 2021-11-10 ENCOUNTER — APPOINTMENT (OUTPATIENT)
Dept: LAB | Age: 31
End: 2021-11-10
Payer: COMMERCIAL

## 2021-11-10 DIAGNOSIS — Z11.4 SCREENING FOR HUMAN IMMUNODEFICIENCY VIRUS: ICD-10-CM

## 2021-11-10 DIAGNOSIS — Z11.59 SCREENING FOR VIRAL AND CHLAMYDIAL DISEASES: ICD-10-CM

## 2021-11-10 DIAGNOSIS — Z11.8 SCREENING FOR VIRAL AND CHLAMYDIAL DISEASES: ICD-10-CM

## 2021-11-10 PROCEDURE — 87491 CHLMYD TRACH DNA AMP PROBE: CPT

## 2021-11-10 PROCEDURE — 87389 HIV-1 AG W/HIV-1&-2 AB AG IA: CPT

## 2021-11-10 PROCEDURE — 87591 N.GONORRHOEAE DNA AMP PROB: CPT

## 2021-11-10 PROCEDURE — 36415 COLL VENOUS BLD VENIPUNCTURE: CPT

## 2021-11-10 PROCEDURE — 86592 SYPHILIS TEST NON-TREP QUAL: CPT

## 2021-11-10 PROCEDURE — 86803 HEPATITIS C AB TEST: CPT

## 2021-11-10 PROCEDURE — 87340 HEPATITIS B SURFACE AG IA: CPT

## 2021-11-11 LAB
HBV SURFACE AG SER QL: NORMAL
HCV AB SER QL: NORMAL
RPR SER QL: NORMAL

## 2021-11-12 LAB — HIV 1+2 AB+HIV1 P24 AG SERPL QL IA: NORMAL

## 2021-11-13 LAB
C TRACH DNA SPEC QL NAA+PROBE: NEGATIVE
N GONORRHOEA DNA SPEC QL NAA+PROBE: NEGATIVE

## 2022-04-04 ENCOUNTER — TELEPHONE (OUTPATIENT)
Dept: PSYCHIATRY | Facility: CLINIC | Age: 32
End: 2022-04-04

## 2022-04-04 NOTE — TELEPHONE ENCOUNTER
MSW intern lvm for pt on 4/1/22 to determine if [facility] services are still needed

## 2022-05-05 ENCOUNTER — TELEPHONE (OUTPATIENT)
Dept: PSYCHIATRY | Facility: CLINIC | Age: 32
End: 2022-05-05

## 2022-06-23 ENCOUNTER — TELEPHONE (OUTPATIENT)
Dept: PSYCHIATRY | Facility: CLINIC | Age: 32
End: 2022-06-23

## 2022-07-12 ENCOUNTER — TELEPHONE (OUTPATIENT)
Dept: PSYCHIATRY | Facility: CLINIC | Age: 32
End: 2022-07-12

## 2022-09-20 ENCOUNTER — TELEPHONE (OUTPATIENT)
Dept: FAMILY MEDICINE CLINIC | Facility: CLINIC | Age: 32
End: 2022-09-20

## 2022-09-20 NOTE — TELEPHONE ENCOUNTER
Pt is requesting a refill of hydrochlorothiazide (MICROZIDE) 12 5 mg capsule  sent to [facility] in [address] [facility]    Pt is scheduled for yearly physical on 10/24 with Dr Melissa Seaman

## 2022-09-22 NOTE — TELEPHONE ENCOUNTER
LMOM for pt  Pt is on "amlodlpine" for his BP not "hydrochlorothiazide"  Would he like a refill of this medication?

## 2022-10-21 ENCOUNTER — RA CDI HCC (OUTPATIENT)
Dept: OTHER | Facility: HOSPITAL | Age: 32
End: 2022-10-21

## 2022-10-21 NOTE — PROGRESS NOTES
[facility]  coding opportunities       Chart reviewed, no opportunity found: CHART REVIEWED, NO OPPORTUNITY FOUND        Patients Insurance        Commercial Insurance: [address] [place] [address]

## 2023-07-05 NOTE — PROGRESS NOTES
Assessment   1  Right shoulder pain (341 97) (E32 430)    Discussion/Summary      Patient is a 61-year-old male Right shoulder pain -patient's symptoms today appear likely secondary to RTC tendinitis  He was educated on the pathophysiology of this problem  Hold off on checking x-ray imaging  He was advised today that he would benefit greatly from ICU shoulder  He was given printed home therapy instructions  He may take anti-inflammatories such as ibuprofen 600 mg q 6 hours for the next 2 days strain not take as needed  If his symptoms are persisting, he may benefit from a formal evaluation by physical therapy  Follow-up in 3-4 weeks if persisting  Chief Complaint   patient here c/o pain in his R/shoulder for 1 week, patient denied any injury,, patient said that his shoulder hurt when try to elevated it  History of Present Illness   Shoulder Pain:    Ferdie Brittle presents with complaints of gradual onset of mild right lateral shoulder pain, described as dull and aching, non-radiating starting 2 weeks ago  On a scale of 1 to 10, the patient rates the pain as 7  Associated symptoms include decreased range of motion-- and-- pain in the arm, but-- no swelling,-- no clicking,-- no numbness in the arm,-- no weakness in the arm,-- no paresthesias,-- no pain in the neck,-- no chest pain,-- no pain in other joints-- and-- no fever  Review of Systems        Constitutional: not feeling poorly-- and-- not feeling tired  ENT: no nosebleeds-- and-- no nasal discharge  Cardiovascular: no chest pain-- and-- no palpitations  Respiratory: no cough-- and-- no shortness of breath during exertion  Gastrointestinal: no nausea-- and-- no diarrhea  Genitourinary: no dysuria-- and-- no nocturia  Musculoskeletal: as noted in HPI  Integumentary: no rashes-- and-- no skin lesions  Neurological: no headache,-- no numbness-- and-- no dizziness  Active Problems   1   Atypical migraine (346 80) (G43 009)   2  Cephalgia (784 0) (R51)   3  Elevated bilirubin (277 4) (R17)   4  Preventative health care (V70 0) (Z00 00)   5  Prolonged bleeding time (790 92) (R79 1)   6  Screening for diabetes mellitus (DM) (V77 1) (Z13 1)   7  Screening for iron deficiency anemia (V78 0) (Z13 0)   8  Vegetarian (V49 89) (Z78 9)    Past Medical History   Active Problems And Past Medical History Reviewed: The active problems and past medical history were reviewed and updated today  Family History   Mother    1  Denied: Family history of substance abuse   2  Denied: Family history of Mental health problem  Father    3  Denied: Family history of substance abuse   4  Denied: Family history of Mental health problem  Family History Reviewed: The family history was reviewed and updated today  Social History    · Always uses seat belt   · Feels safe at home   · Never a smoker   · Social alcohol use (Z78 9)   · Vegetarian (V49 89) (Z78 9)  The social history was reviewed and updated today  The social history was reviewed and is unchanged  Surgical History   Surgical History Reviewed: The surgical history was reviewed and updated today  Current Meds    1  Allegra Allergy 180 MG Oral Tablet; take 1 tablet every day; Therapy: 42MMC0800 to Recorded   2  Magnesium Oxide 500 MG Oral Tablet; TAKE 1 TABLET DAILY; Therapy: 76LKK1281 to (Evaluate:30Uny7344); Last Rx:06Nov2017 Ordered   3  SUMAtriptan Succinate 50 MG Oral Tablet; TAKE 1 TABLET FOR MIGRAINE RELIEF      MAY REPEAT EVERY 2 HOURS  MAX 200MG/DAY; Therapy: 08AOD4312 to (Last ZS:29SXW3085)  Requested for: 10DQF5640 Ordered     The medication list was reviewed and updated today  Allergies   1   No Known Drug Allergies    Vitals    Recorded: 94THZ0023 02:21PM   Temperature 97 8 F, Tympanic   Heart Rate 75   Pulse Quality Normal   Respiration Quality Normal   Respiration 18   Systolic 021, LUE, Sitting   Diastolic 80, LUE, Sitting   Height 5 ft 11 in   Weight 172 lb 6 oz   BMI Calculated 24 04   BSA Calculated 1 98   O2 Saturation 98   Pain Scale 8     Physical Exam        Constitutional      General appearance: No acute distress, well appearing and well nourished  Eyes      Conjunctiva and lids: No swelling, erythema, or discharge  Pupils and irises: Equal, round and reactive to light  Ears, Nose, Mouth, and Throat      External inspection of ears and nose: Normal        Nasal mucosa, septum, and turbinates: Normal without edema or erythema  Oropharynx: Normal with no erythema, edema, exudate or lesions  Pulmonary      Respiratory effort: No increased work of breathing or signs of respiratory distress  Auscultation of lungs: Clear to auscultation, equal breath sounds bilaterally, no wheezes, no rales, no rhonci  Cardiovascular      Auscultation of heart: Normal rate and rhythm, normal S1 and S2, without murmurs  Examination of extremities for edema and/or varicosities: Normal        Abdomen      Abdomen: Non-tender, no masses  Liver and spleen: No hepatomegaly or splenomegaly  Lymphatic      Palpation of lymph nodes in neck: No lymphadenopathy  Musculoskeletal      Gait and station: Normal        Inspection/palpation of joints, bones, and muscles: Abnormal        Skin      Skin and subcutaneous tissue: Normal without rashes or lesions  Neurologic      Cranial nerves: Cranial nerves 2-12 intact  Sensation: No sensory loss  Psychiatric      Orientation to person, place and time: Normal        Mood and affect: Normal         Neck Exam: full ROM  Right Shoulder: Appearance: no visible abnormalities  Tenderness: pain upon palpation  Clavicle Exam:  no deformities   Special Test: a intact rotator cuff ,-- a positive Neer impingement test ,-- a positive cross arm test for impingement -- and-- against resistance , but-- a non-restricted abduction and external rotation with scratch test-- and-- a non-restricted adduction and internal rotation with scratch test       Signatures    Electronically signed by :  Ana Cordero DO; Jan 22 2018 10:11PM EST                       (Author) Yes

## 2023-12-05 ENCOUNTER — TELEPHONE (OUTPATIENT)
Dept: FAMILY MEDICINE CLINIC | Facility: CLINIC | Age: 33
End: 2023-12-05

## 2023-12-22 NOTE — TELEPHONE ENCOUNTER
12/22/23 7:31 AM        The office's request has been received, reviewed, and the patient chart updated. The PCP has successfully been removed with a patient attribution note. This message will now be completed.        Thank you  Austin Torres

## 2024-05-14 ENCOUNTER — OFFICE VISIT (OUTPATIENT)
Dept: FAMILY MEDICINE CLINIC | Facility: CLINIC | Age: 34
End: 2024-05-14
Payer: COMMERCIAL

## 2024-05-14 ENCOUNTER — APPOINTMENT (OUTPATIENT)
Dept: LAB | Facility: CLINIC | Age: 34
End: 2024-05-14
Payer: COMMERCIAL

## 2024-05-14 VITALS
HEART RATE: 85 BPM | OXYGEN SATURATION: 97 % | RESPIRATION RATE: 16 BRPM | HEIGHT: 69 IN | BODY MASS INDEX: 29.62 KG/M2 | DIASTOLIC BLOOD PRESSURE: 82 MMHG | WEIGHT: 200 LBS | TEMPERATURE: 98.1 F | SYSTOLIC BLOOD PRESSURE: 126 MMHG

## 2024-05-14 DIAGNOSIS — Z13.89 SCREENING FOR MULTIPLE CONDITIONS: ICD-10-CM

## 2024-05-14 DIAGNOSIS — Z00.00 ANNUAL PHYSICAL EXAM: Primary | ICD-10-CM

## 2024-05-14 DIAGNOSIS — Z12.83 SKIN CANCER SCREENING: ICD-10-CM

## 2024-05-14 PROBLEM — R17 ELEVATED BILIRUBIN: Status: ACTIVE | Noted: 2024-05-14

## 2024-05-14 PROBLEM — R00.2 PALPITATION: Status: RESOLVED | Noted: 2018-01-25 | Resolved: 2024-05-14

## 2024-05-14 PROBLEM — I10 BENIGN ESSENTIAL HYPERTENSION: Status: RESOLVED | Noted: 2020-09-10 | Resolved: 2024-05-14

## 2024-05-14 PROBLEM — G43.009 ATYPICAL MIGRAINE: Status: RESOLVED | Noted: 2017-11-06 | Resolved: 2024-05-14

## 2024-05-14 PROBLEM — M25.511 RIGHT SHOULDER PAIN: Status: RESOLVED | Noted: 2018-01-22 | Resolved: 2024-05-14

## 2024-05-14 LAB
ALBUMIN SERPL BCP-MCNC: 4.8 G/DL (ref 3.5–5)
ALP SERPL-CCNC: 57 U/L (ref 34–104)
ALT SERPL W P-5'-P-CCNC: 25 U/L (ref 7–52)
ANION GAP SERPL CALCULATED.3IONS-SCNC: 9 MMOL/L (ref 4–13)
AST SERPL W P-5'-P-CCNC: 19 U/L (ref 13–39)
BILIRUB SERPL-MCNC: 1.26 MG/DL (ref 0.2–1)
BUN SERPL-MCNC: 16 MG/DL (ref 5–25)
CALCIUM SERPL-MCNC: 9.3 MG/DL (ref 8.4–10.2)
CHLORIDE SERPL-SCNC: 106 MMOL/L (ref 96–108)
CHOLEST SERPL-MCNC: 188 MG/DL
CO2 SERPL-SCNC: 26 MMOL/L (ref 21–32)
CREAT SERPL-MCNC: 0.88 MG/DL (ref 0.6–1.3)
ERYTHROCYTE [DISTWIDTH] IN BLOOD BY AUTOMATED COUNT: 12.6 % (ref 11.6–15.1)
GFR SERPL CREATININE-BSD FRML MDRD: 114 ML/MIN/1.73SQ M
GLUCOSE P FAST SERPL-MCNC: 96 MG/DL (ref 65–99)
HCT VFR BLD AUTO: 46 % (ref 36.5–49.3)
HDLC SERPL-MCNC: 47 MG/DL
HGB BLD-MCNC: 15.6 G/DL (ref 12–17)
LDLC SERPL CALC-MCNC: 109 MG/DL (ref 0–100)
MCH RBC QN AUTO: 30.8 PG (ref 26.8–34.3)
MCHC RBC AUTO-ENTMCNC: 33.9 G/DL (ref 31.4–37.4)
MCV RBC AUTO: 91 FL (ref 82–98)
PLATELET # BLD AUTO: 287 THOUSANDS/UL (ref 149–390)
PMV BLD AUTO: 10.6 FL (ref 8.9–12.7)
POTASSIUM SERPL-SCNC: 4.4 MMOL/L (ref 3.5–5.3)
PROT SERPL-MCNC: 7.3 G/DL (ref 6.4–8.4)
RBC # BLD AUTO: 5.07 MILLION/UL (ref 3.88–5.62)
SODIUM SERPL-SCNC: 141 MMOL/L (ref 135–147)
TRIGL SERPL-MCNC: 159 MG/DL
WBC # BLD AUTO: 6.32 THOUSAND/UL (ref 4.31–10.16)

## 2024-05-14 PROCEDURE — 85027 COMPLETE CBC AUTOMATED: CPT

## 2024-05-14 PROCEDURE — 36415 COLL VENOUS BLD VENIPUNCTURE: CPT

## 2024-05-14 PROCEDURE — 80061 LIPID PANEL: CPT

## 2024-05-14 PROCEDURE — 99395 PREV VISIT EST AGE 18-39: CPT | Performed by: PHYSICIAN ASSISTANT

## 2024-05-14 PROCEDURE — 80053 COMPREHEN METABOLIC PANEL: CPT

## 2024-05-14 RX ORDER — MULTIVITAMIN
1 TABLET ORAL DAILY
COMMUNITY

## 2024-05-14 NOTE — PROGRESS NOTES
ADULT ANNUAL PHYSICAL  [facility]    NAME: Jose Sanford  AGE: 33 y.o. SEX: male  : 1990     DATE: 2024     Assessment and Plan:     Problem List Items Addressed This Visit    None  Visit Diagnoses       Annual physical exam    -  Primary    Screening for multiple conditions        Relevant Orders    CBC    Comprehensive metabolic panel    Lipid Panel with Direct LDL reflex    Skin cancer screening        Relevant Orders    Ambulatory Referral to Dermatology              Immunizations and preventive care screenings were discussed with patient today. Appropriate education was printed on patient's after visit summary.    Counseling:  Dental Health: discussed importance of regular tooth brushing, flossing, and dental visits.      Depression Screening and Follow-up Plan: Patient was screened for depression during today's encounter. They screened negative with a PHQ-2 score of 0.        Return in about 1 year (around 2025) for Annual physical.     Chief Complaint:     Chief Complaint   Patient presents with    Establish Care     Pt presents in office to establish care   Pt was last seen about 1 to 2 years ago       History of Present Illness:     Adult Annual Physical   Patient here for a comprehensive physical exam. The patient reports no problems. He is requesting referral to dermatology for annual screening. Also, reports that has had elevated bilirubin his whole life, never formally diagnosed with anything but wife wanted him to ask about it. Discussed could possibly Gilbert's disease, would need to see GI for a formal diagnosis. Pt declined GI referral at this time.    Diet and Physical Activity  Diet/Nutrition: well balanced diet.   Exercise: moderate cardiovascular exercise.      Depression Screening  PHQ-2/9 Depression Screening    Little interest or pleasure in doing things: 0 - not at all  Feeling down, depressed, or hopeless: 0 - not at  all  PHQ-2 Score: 0  PHQ-2 Interpretation: Negative depression screen       General Health  Hearing: normal - bilateral.  Vision: no vision problems.   Dental: regular dental visits.          Review of Systems:     Review of Systems   Constitutional:  Negative for chills and fever.   HENT:  Negative for ear pain and sore throat.    Eyes:  Negative for pain and visual disturbance.   Respiratory:  Negative for cough and shortness of breath.    Cardiovascular:  Negative for chest pain and palpitations.   Gastrointestinal:  Negative for abdominal pain and vomiting.   Genitourinary:  Negative for dysuria and hematuria.   Musculoskeletal:  Negative for arthralgias and back pain.   Skin:  Negative for color change and rash.   Neurological:  Negative for seizures and syncope.   All other systems reviewed and are negative.     Past Medical History:     Past Medical History:   Diagnosis Date    Benign essential hypertension 9/10/2020      Past Surgical History:     Past Surgical History:   Procedure Laterality Date    ADENOIDECTOMY      FACIAL COSMETIC SURGERY      TONSILLECTOMY      TONSILLECTOMY        Social History:     Social History     Socioeconomic History    Marital status: /Civil Union     Spouse name: None    Number of children: None    Years of education: None    Highest education level: None   Occupational History    None   Tobacco Use    Smoking status: Never    Smokeless tobacco: Never   Vaping Use    Vaping status: Never Used   Substance and Sexual Activity    Alcohol use: Yes     Comment: social    Drug use: Never    Sexual activity: Yes     Partners: Female     Birth control/protection: None   Other Topics Concern    None   Social History Narrative    Always uses seat belt    Feels safe at home    Vegetarian      Social Determinants of Health     Financial Resource Strain: Not on file   Food Insecurity: Not on file   Transportation Needs: Not on file   Physical Activity: Not on file   Stress: Not on  "file   Social Connections: Not on file   Intimate Partner Violence: Not on file   Housing Stability: Not on file      Family History:     Family History   Problem Relation Age of Onset    Hypertension Mother     Hypertension Father     Hyperlipidemia Family     Hypertension Family       Current Medications:     Current Outpatient Medications   Medication Sig Dispense Refill    fexofenadine (ALLEGRA) 180 MG tablet Take 1 tablet by mouth as needed        Multiple Vitamin (multivitamin) tablet Take 1 tablet by mouth daily       No current facility-administered medications for this visit.      Allergies:     Allergies   Allergen Reactions    Other      Seasonal      Physical Exam:     /82   Pulse 85   Temp 98.1 °F (36.7 °C) (Temporal)   Resp 16   Ht 5' 8.75\" (1.746 m)   Wt 90.7 kg (200 lb)   SpO2 97%   BMI 29.75 kg/m²     Physical Exam  Vitals and nursing note reviewed.   Constitutional:       General: He is not in acute distress.     Appearance: He is well-developed.   HENT:      Head: Normocephalic and atraumatic.   Eyes:      Conjunctiva/sclera: Conjunctivae normal.   Cardiovascular:      Rate and Rhythm: Normal rate and regular rhythm.      Heart sounds: No murmur heard.  Pulmonary:      Effort: Pulmonary effort is normal. No respiratory distress.      Breath sounds: Normal breath sounds.   Abdominal:      Palpations: Abdomen is soft.      Tenderness: There is no abdominal tenderness.   Musculoskeletal:         General: No swelling.      Cervical back: Neck supple.   Skin:     General: Skin is warm and dry.      Capillary Refill: Capillary refill takes less than 2 seconds.   Neurological:      Mental Status: He is alert.   Psychiatric:         Mood and Affect: Mood normal.          Vanita Parmar PA-C   [place] [facility]    "

## 2024-05-15 DIAGNOSIS — R17 ELEVATED BILIRUBIN: Primary | ICD-10-CM

## 2024-07-29 ENCOUNTER — CONSULT (OUTPATIENT)
Dept: GASTROENTEROLOGY | Facility: CLINIC | Age: 34
End: 2024-07-29
Payer: COMMERCIAL

## 2024-07-29 VITALS
DIASTOLIC BLOOD PRESSURE: 82 MMHG | HEIGHT: 69 IN | OXYGEN SATURATION: 97 % | BODY MASS INDEX: 30.21 KG/M2 | WEIGHT: 204 LBS | TEMPERATURE: 97.5 F | SYSTOLIC BLOOD PRESSURE: 139 MMHG | HEART RATE: 75 BPM

## 2024-07-29 DIAGNOSIS — R17 ELEVATED BILIRUBIN: ICD-10-CM

## 2024-07-29 DIAGNOSIS — Z00.00 HEALTHCARE MAINTENANCE: Primary | ICD-10-CM

## 2024-07-29 PROCEDURE — 99203 OFFICE O/P NEW LOW 30 MIN: CPT | Performed by: PHYSICIAN ASSISTANT

## 2024-07-29 NOTE — PROGRESS NOTES
[facility] - Outpatient Consultation  Jose Sanford 34 y.o. male MRN: [id number]  Encounter: [id number]    ASSESSMENT AND PLAN:      1. Elevated bilirubin    Pt with history of isolated hyperbilirubinemia.  No evidence of anemia.    Several years ago did have bilirubin fractionation which was primarily indirect.  I am most suspicious this is secondary to a benign conjugation issue, though we will repeat fractionation now and check an ultrasound for completeness.  If blood work is primarily indirect and there is no evidence of parenchymal abnormalities or biliary abnormalities on imaging, most likely diagnosis Gilbert's syndrome and at this time there is no additional investigation that would be required.    - Ambulatory Referral to Gastroenterology  - Hepatic function panel; Future  - US right upper quadrant; Future    2. Healthcare maintenance     Pt is of average risk, no family hx of CRC.   So long as there are no alarm features, we did review recommendation would be for index colonoscopy at age 45.     Follow up as needed for development of new GI symptoms.   ______________________________________________________________________    HPI: Patient is a 34 y.o. male who presents today for a consultation regarding elevated bilirubin. Pmhx sig for HTN. Hx of tonsillectomy.     Patient is new to clinic.  He is feeling well overall.  He denies any heartburn, indigestion, nausea, vomiting, dysphagia or odynophagia.  No unintentional weight loss in the past 6 months.    He is having formed brown stool daily.  Denies any BRBPR or melena.  Did an at home fit test which was negative.  No family history of colon cancer.  No acholic stool.    Patient denies any family history of chronic liver disease outside of that secondary to alcohol intake.  No yellowing of the eyes or the skin.  No pruritus.  No rashes.  No abdominal swelling or lower extremity swelling. No illicit drug use.     No family  "hx of CRC.     NSAIDs: no regular use   Etoh: 3-4 beers weekends only   Tobacco: none  Cannabis: none    05/2024: Hb 15.6, MCV 91, Plt 287, BUN 16, Cr 0.88, AST 19, ALT 25, ALP 57, albumin 4.8, t bili 1.26     Endoscopic history:   EGD: none  Colon: none    Review of Systems   Constitutional:  Negative for appetite change, fever and unexpected weight change.   HENT:  Negative for mouth sores and trouble swallowing.    Gastrointestinal:  Negative for abdominal pain, anal bleeding, constipation, nausea and vomiting.   Skin:  Negative for color change and rash.   Neurological:  Negative for seizures and syncope.   Otherwise Per HPI    Historical Information   Past Medical History:   Diagnosis Date    Benign essential hypertension 9/10/2020     Past Surgical History:   Procedure Laterality Date    ADENOIDECTOMY      FACIAL COSMETIC SURGERY      TONSILLECTOMY      TONSILLECTOMY       Social History   Social History     Substance and Sexual Activity   Alcohol Use Yes    Comment: social     Social History     Substance and Sexual Activity   Drug Use Never     Social History     Tobacco Use   Smoking Status Never   Smokeless Tobacco Never     Family History   Problem Relation Age of Onset    Hypertension Mother     Hypertension Father     Hyperlipidemia Family     Hypertension Family        Meds/Allergies       Current Outpatient Medications:     fexofenadine (ALLEGRA) 180 MG tablet    Multiple Vitamin (multivitamin) tablet    Allergies   Allergen Reactions    Other      Seasonal       Objective     Blood pressure 139/82, pulse 75, temperature 97.5 °F (36.4 °C), temperature source Tympanic, height 5' 8.75\" (1.746 m), weight 92.5 kg (204 lb), SpO2 97%. Body mass index is 30.35 kg/m².    Physical Exam  Vitals and nursing note reviewed.   Constitutional:       General: He is not in acute distress.     Appearance: He is well-developed.   HENT:      Head: Normocephalic and atraumatic.   Eyes:      General: No scleral icterus.    "  Conjunctiva/sclera: Conjunctivae normal.   Cardiovascular:      Rate and Rhythm: Normal rate.      Heart sounds: No murmur heard.  Pulmonary:      Effort: Pulmonary effort is normal. No respiratory distress.      Breath sounds: Normal breath sounds.   Abdominal:      General: Bowel sounds are normal. There is no distension.      Palpations: Abdomen is soft.      Tenderness: There is no abdominal tenderness. There is no guarding or rebound.   Skin:     General: Skin is warm and dry.      Coloration: Skin is not jaundiced.   Neurological:      General: No focal deficit present.      Mental Status: He is alert and oriented to person, place, and time.   Psychiatric:         Mood and Affect: Mood normal.        Lab Results:   No visits with results within 1 Day(s) from this visit.   Latest known visit with results is:   Appointment on 05/14/2024   Component Date Value    WBC 05/14/2024 6.32     RBC 05/14/2024 5.07     Hemoglobin 05/14/2024 15.6     Hematocrit 05/14/2024 46.0     MCV 05/14/2024 91     MCH 05/14/2024 30.8     MCHC 05/14/2024 33.9     RDW 05/14/2024 12.6     Platelets 05/14/2024 287     MPV 05/14/2024 10.6     Sodium 05/14/2024 141     Potassium 05/14/2024 4.4     Chloride 05/14/2024 106     CO2 05/14/2024 26     ANION GAP 05/14/2024 9     BUN 05/14/2024 16     Creatinine 05/14/2024 0.88     Glucose, Fasting 05/14/2024 96     Calcium 05/14/2024 9.3     AST 05/14/2024 19     ALT 05/14/2024 25     Alkaline Phosphatase 05/14/2024 57     Total Protein 05/14/2024 7.3     Albumin 05/14/2024 4.8     Total Bilirubin 05/14/2024 1.26 (H)     eGFR 05/14/2024 114     Cholesterol 05/14/2024 188     Triglycerides 05/14/2024 159 (H)     HDL, Direct 05/14/2024 47     LDL Calculated 05/14/2024 109 (H)      Radiology Results:   No results found.    Maricarmen Velez PA-C    **Please note:  Dictation voice to text software may have been used in the creation of this record.  Occasional wrong word or “sound alike” substitutions  may have occurred due to the inherent limitations of voice recognition software.  Read the chart carefully and recognize, using context, where substitutions have occurred.**

## 2024-07-29 NOTE — PATIENT INSTRUCTIONS
Let's repeat blood work now and split apart your bilirubin.   Most suspicious benign origin such as gilbert's syndrome.   Check US for completeness.

## 2024-07-30 ENCOUNTER — NURSE TRIAGE (OUTPATIENT)
Age: 34
End: 2024-07-30

## 2024-07-30 NOTE — TELEPHONE ENCOUNTER
Pt had OV yesterday for elevated bilirubin. 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 Pt wanted to know if he requires any further follow up based on this information/       Reason for Disposition   Information only question and nurse able to answer    Protocols used: Information Only Call - No Triage-ADULT-[place]

## 2024-07-30 NOTE — TELEPHONE ENCOUNTER
SPOKE WITH PT, INFORMED OF PROVIDER RECOMMENDATION. WILL DISCUSS WITH HIS WIFE, WILL CALL BACK IF HE WANTS TO PURSUE GENETIC COUNSELING.

## 2024-07-30 NOTE — TELEPHONE ENCOUNTER
I had to look into this a bit.  It appears patients who have a diagnosis of Bloom syndrome (though it sounds pt has NOT been diagnosed with this) have a predisposition to early development of a wide variety of cancers, though they are most commonly leukemia and non-Hodgkin's lymphoma.  It does not appear there are any GI specific early screening recommendations from what I could find on my initial investigation. I would say in general that should he develop any rectal bleeding, change in bowel habits, abnormal weight loss, I would have a low threshold to recommend a colonoscopy.     If the patient would like to speak to a genetic counselor about these abnormal results that he received, I can happily place a referral for him.

## 2024-08-01 ENCOUNTER — HOSPITAL ENCOUNTER (OUTPATIENT)
Dept: ULTRASOUND IMAGING | Facility: HOSPITAL | Age: 34
End: 2024-08-01
Payer: COMMERCIAL

## 2024-08-01 DIAGNOSIS — R17 ELEVATED BILIRUBIN: ICD-10-CM

## 2024-08-01 PROCEDURE — 76705 ECHO EXAM OF ABDOMEN: CPT

## 2024-08-07 ENCOUNTER — APPOINTMENT (OUTPATIENT)
Dept: LAB | Facility: CLINIC | Age: 34
End: 2024-08-07
Payer: COMMERCIAL

## 2024-08-07 DIAGNOSIS — R17 ELEVATED BILIRUBIN: ICD-10-CM

## 2024-08-07 LAB
ALBUMIN SERPL BCG-MCNC: 4.3 G/DL (ref 3.5–5)
ALP SERPL-CCNC: 56 U/L (ref 34–104)
ALT SERPL W P-5'-P-CCNC: 17 U/L (ref 7–52)
AST SERPL W P-5'-P-CCNC: 15 U/L (ref 13–39)
BILIRUB DIRECT SERPL-MCNC: 0.15 MG/DL (ref 0–0.2)
BILIRUB SERPL-MCNC: 1.24 MG/DL (ref 0.2–1)
PROT SERPL-MCNC: 7.2 G/DL (ref 6.4–8.4)

## 2024-08-07 PROCEDURE — 80076 HEPATIC FUNCTION PANEL: CPT

## 2024-08-07 PROCEDURE — 36415 COLL VENOUS BLD VENIPUNCTURE: CPT

## 2024-08-12 ENCOUNTER — TELEPHONE (OUTPATIENT)
Dept: FAMILY MEDICINE CLINIC | Facility: CLINIC | Age: 34
End: 2024-08-12

## 2025-01-10 ENCOUNTER — TELEPHONE (OUTPATIENT)
Dept: FAMILY MEDICINE CLINIC | Facility: CLINIC | Age: 35
End: 2025-01-10

## 2025-01-10 DIAGNOSIS — I10 ESSENTIAL HYPERTENSION: Primary | ICD-10-CM

## 2025-01-10 DIAGNOSIS — I10 ESSENTIAL HYPERTENSION: ICD-10-CM

## 2025-01-10 RX ORDER — LOSARTAN POTASSIUM 50 MG/1
50 TABLET ORAL DAILY
Qty: 30 TABLET | Refills: 0 | Status: SHIPPED | OUTPATIENT
Start: 2025-01-10 | End: 2025-01-10 | Stop reason: SDUPTHER

## 2025-01-10 RX ORDER — LOSARTAN POTASSIUM 50 MG/1
50 TABLET ORAL DAILY
Qty: 30 TABLET | Refills: 0 | Status: SHIPPED | OUTPATIENT
Start: 2025-01-10

## 2025-01-10 NOTE — TELEPHONE ENCOUNTER
Will start him on losartan 50 mg, I'd like him to come into the office in 2 weeks for follow up appointment with me.

## 2025-01-21 ENCOUNTER — OFFICE VISIT (OUTPATIENT)
Dept: FAMILY MEDICINE CLINIC | Facility: CLINIC | Age: 35
End: 2025-01-21
Payer: COMMERCIAL

## 2025-01-21 VITALS
OXYGEN SATURATION: 98 % | DIASTOLIC BLOOD PRESSURE: 80 MMHG | HEART RATE: 67 BPM | WEIGHT: 207 LBS | BODY MASS INDEX: 30.66 KG/M2 | RESPIRATION RATE: 16 BRPM | TEMPERATURE: 97.9 F | HEIGHT: 69 IN | SYSTOLIC BLOOD PRESSURE: 116 MMHG

## 2025-01-21 DIAGNOSIS — I10 ESSENTIAL HYPERTENSION: ICD-10-CM

## 2025-01-21 PROCEDURE — 99213 OFFICE O/P EST LOW 20 MIN: CPT | Performed by: PHYSICIAN ASSISTANT

## 2025-01-21 RX ORDER — LOSARTAN POTASSIUM 50 MG/1
50 TABLET ORAL DAILY
Qty: 90 TABLET | Refills: 0 | Status: SHIPPED | OUTPATIENT
Start: 2025-01-21

## 2025-01-21 NOTE — PROGRESS NOTES
"Name: Jose Sanford      : 1990      MRN: [id number]  Encounter Provider: Vanita Parmar PA-C  Encounter Date: 2025   Encounter department: [place] [facility]  :  Assessment & Plan  Essential hypertension  Well controlled, continue same. RTO 3 months or sooner PRN.  Orders:  •  losartan (COZAAR) 50 mg tablet; Take 1 tablet (50 mg total) by mouth daily          Depression Screening and Follow-up Plan: Patient was screened for depression during today's encounter. They screened negative with a PHQ-2 score of 0.      History of Present Illness   Jose is here today to follow up for new HTN medication initiation. BP now well controlled.      Review of Systems   Constitutional:  Negative for chills and fever.   HENT:  Negative for ear pain and sore throat.    Eyes:  Negative for pain and visual disturbance.   Respiratory:  Negative for cough and shortness of breath.    Cardiovascular:  Negative for chest pain and palpitations.   Gastrointestinal:  Negative for abdominal pain and vomiting.   Genitourinary:  Negative for dysuria and hematuria.   Musculoskeletal:  Negative for arthralgias and back pain.   Skin:  Negative for color change and rash.   Neurological:  Negative for seizures and syncope.   All other systems reviewed and are negative.      Objective   /80 (BP Location: Left arm, Patient Position: Sitting, Cuff Size: Standard)   Pulse 67   Temp 97.9 °F (36.6 °C) (Temporal)   Resp 16   Ht 5' 8.75\" (1.746 m)   Wt 93.9 kg (207 lb)   SpO2 98%   BMI 30.79 kg/m²      Physical Exam  Vitals reviewed.   Constitutional:       General: He is not in acute distress.     Appearance: He is well-developed. He is not diaphoretic.   HENT:      Head: Normocephalic and atraumatic.      Right Ear: Hearing, tympanic membrane, ear canal and external ear normal.      Left Ear: Hearing, tympanic membrane, ear canal and external ear normal.      Nose: Nose normal.      Mouth/Throat:      Mouth: Mucous " membranes are moist.      Pharynx: Oropharynx is clear. Uvula midline. No oropharyngeal exudate.   Eyes:      General: No scleral icterus.        Right eye: No discharge.         Left eye: No discharge.      Conjunctiva/sclera: Conjunctivae normal.   Neck:      Thyroid: No thyromegaly.      Vascular: No carotid bruit.   Cardiovascular:      Rate and Rhythm: Normal rate and regular rhythm.      Heart sounds: Normal heart sounds. No murmur heard.  Pulmonary:      Effort: Pulmonary effort is normal. No respiratory distress.      Breath sounds: Normal breath sounds. No wheezing.   Abdominal:      General: Bowel sounds are normal. There is no distension.      Palpations: Abdomen is soft. There is no mass.      Tenderness: There is no abdominal tenderness. There is no guarding or rebound.   Musculoskeletal:         General: No tenderness. Normal range of motion.      Cervical back: Neck supple.   Lymphadenopathy:      Cervical: No cervical adenopathy.   Skin:     General: Skin is warm and dry.      Findings: No erythema or rash.   Neurological:      Mental Status: He is alert and oriented to person, place, and time.   Psychiatric:         Behavior: Behavior normal.         Thought Content: Thought content normal.         Judgment: Judgment normal.

## 2025-04-22 ENCOUNTER — OFFICE VISIT (OUTPATIENT)
Dept: FAMILY MEDICINE CLINIC | Facility: CLINIC | Age: 35
End: 2025-04-22
Payer: COMMERCIAL

## 2025-04-22 VITALS
TEMPERATURE: 97.8 F | RESPIRATION RATE: 18 BRPM | OXYGEN SATURATION: 98 % | DIASTOLIC BLOOD PRESSURE: 80 MMHG | HEIGHT: 69 IN | HEART RATE: 72 BPM | BODY MASS INDEX: 28.64 KG/M2 | SYSTOLIC BLOOD PRESSURE: 122 MMHG | WEIGHT: 193.4 LBS

## 2025-04-22 DIAGNOSIS — I10 ESSENTIAL HYPERTENSION: ICD-10-CM

## 2025-04-22 PROCEDURE — 99213 OFFICE O/P EST LOW 20 MIN: CPT | Performed by: PHYSICIAN ASSISTANT

## 2025-04-22 RX ORDER — LOSARTAN POTASSIUM 50 MG/1
50 TABLET ORAL DAILY
Qty: 90 TABLET | Refills: 1 | Status: SHIPPED | OUTPATIENT
Start: 2025-04-22

## 2025-04-22 NOTE — PROGRESS NOTES
"Name: Jose Sanford      : 1990      MRN: [id number]  Encounter Provider: Vanita Parmar PA-C  Encounter Date: 2025   Encounter department: [facility]  :  Assessment & Plan  Essential hypertension  Well controlled, continue same. Refills given today.  Orders:  •  losartan (COZAAR) 50 mg tablet; Take 1 tablet (50 mg total) by mouth daily          Depression Screening and Follow-up Plan: Patient was screened for depression during today's encounter. They screened negative with a PHQ-2 score of 0.        History of Present Illness   Jose is here today for 3 month follow up for HTN. BP remains well controlled, does not complain of any adverse events.      Review of Systems   Constitutional:  Negative for chills and fever.   HENT:  Negative for ear pain and sore throat.    Eyes:  Negative for pain and visual disturbance.   Respiratory:  Negative for cough and shortness of breath.    Cardiovascular:  Negative for chest pain and palpitations.   Gastrointestinal:  Negative for abdominal pain and vomiting.   Genitourinary:  Negative for dysuria and hematuria.   Musculoskeletal:  Negative for arthralgias and back pain.   Skin:  Negative for color change and rash.   Neurological:  Negative for seizures and syncope.   All other systems reviewed and are negative.      Objective   /80 (BP Location: Left arm)   Pulse 72   Temp 97.8 °F (36.6 °C) (Temporal)   Resp 18   Ht 5' 8.75\" (1.746 m)   Wt 87.7 kg (193 lb 6.4 oz)   SpO2 98%   BMI 28.77 kg/m²      Physical Exam  Vitals reviewed.   Constitutional:       General: He is not in acute distress.     Appearance: He is well-developed. He is not diaphoretic.   HENT:      Head: Normocephalic and atraumatic.      Right Ear: Hearing, tympanic membrane, ear canal and external ear normal.      Left Ear: Hearing, tympanic membrane, ear canal and external ear normal.      Nose: Nose normal.      Mouth/Throat:      Mouth: Mucous membranes are moist.     "  Pharynx: Oropharynx is clear. Uvula midline. No oropharyngeal exudate.   Eyes:      General: No scleral icterus.        Right eye: No discharge.         Left eye: No discharge.      Conjunctiva/sclera: Conjunctivae normal.   Neck:      Thyroid: No thyromegaly.      Vascular: No carotid bruit.   Cardiovascular:      Rate and Rhythm: Normal rate and regular rhythm.      Heart sounds: Normal heart sounds. No murmur heard.  Pulmonary:      Effort: Pulmonary effort is normal. No respiratory distress.      Breath sounds: Normal breath sounds. No wheezing.   Abdominal:      General: Bowel sounds are normal. There is no distension.      Palpations: Abdomen is soft. There is no mass.      Tenderness: There is no abdominal tenderness. There is no guarding or rebound.   Musculoskeletal:         General: No tenderness. Normal range of motion.      Cervical back: Neck supple.   Lymphadenopathy:      Cervical: No cervical adenopathy.   Skin:     General: Skin is warm and dry.      Findings: No erythema or rash.   Neurological:      Mental Status: He is alert and oriented to person, place, and time.   Psychiatric:         Behavior: Behavior normal.         Thought Content: Thought content normal.         Judgment: Judgment normal.

## 2025-05-24 DIAGNOSIS — I10 ESSENTIAL HYPERTENSION: ICD-10-CM

## 2025-05-25 RX ORDER — LOSARTAN POTASSIUM 50 MG/1
50 TABLET ORAL DAILY
Qty: 30 TABLET | Refills: 0 | Status: SHIPPED | OUTPATIENT
Start: 2025-05-25

## 2025-06-02 ENCOUNTER — NURSE TRIAGE (OUTPATIENT)
Age: 35
End: 2025-06-02

## 2025-06-02 NOTE — TELEPHONE ENCOUNTER
"REASON FOR CONVERSATION: Depression    SYMPTOMS: Patient is reporting depression and anxiety related to work. Tiredness, unable to sleep at night    OTHER HEALTH INFORMATION: Patient reports getting a promotion 2 months ago and is the main reason for his symptoms. Patient is requesting a referral to see a psychiatrist. Denies any thoughts to harm himself or others.    PROTOCOL DISPOSITION: Discuss With PCP and Callback by Nurse Today (overriding See Within 3 Days in Office)    CARE ADVICE PROVIDED: [facility] helpline information, anxiety  [facility] hotline information. Call back if symptoms worsen.    PRACTICE FOLLOW-UP: Please call back patient with further recommendations.      Reason for Disposition   Requesting to talk with a counselor (mental health worker, psychiatrist, etc.)    Answer Assessment - Initial Assessment Questions  1. CONCERN: \"What happened that made you call today?\"      Anxiety and depression    2. DEPRESSION SYMPTOM SCREENING: \"How are you feeling overall?\" (e.g., decreased energy, increased sleeping or difficulty sleeping, difficulty concentrating, feelings of sadness, guilt, hopelessness, or worthlessness)      Tiredness unable to sleep at night    3. RISK OF HARM - SUICIDAL IDEATION:  \"Do you ever have thoughts of hurting or killing yourself?\"  (e.g., yes, no, no but preoccupation with thoughts about death)      Denies    4. RISK OF HARM - HOMICIDAL IDEATION:  \"Do you ever have thoughts of hurting or killing someone else?\"  (e.g., yes, no, no but preoccupation with thoughts about death)      Denies    5. FUNCTIONAL IMPAIRMENT: \"How have things been going for you overall? Have you had more difficulty than usual doing your normal daily activities?\"  (e.g., better, same, worse; self-care, school, work, interactions)      Patient is very productive but not much enjoyment    6. SUPPORT: \"Who is with you now?\" \"Who do you live with?\" \"Do you have family or friends who you can talk to?\"       Yes family and " "friends    7. THERAPIST: \"Do you have a counselor or therapist?\" If Yes, ask: \"What is their name?\"      Denies    8. STRESSORS: \"Has there been any new stress or recent changes in your life?\"      Patient promoted in the past two months which has been causing him stress    9. ALCOHOL USE OR SUBSTANCE USE (DRUG USE): \"Do you drink alcohol or use any illegal drugs?\"      Drink alcohol on the weekends socially     10. OTHER: \"Do you have any other physical symptoms right now?\" (e.g., fever)        Denies    Protocols used: Depression-Adult-[place]    "

## 2025-06-02 NOTE — TELEPHONE ENCOUNTER
Regarding: anxiety/ depression  ----- Message from Magy ALFARO sent at 6/2/2025  1:56 PM EDT -----  My Chart message sent on 5/31    Hey there,     I would to have some type of consult to discuss an anti anxiety/depressant? I am struggling to keep up with my stress of daily life and fear it is having an impact on others in my life.     Any info would be great

## 2025-06-03 DIAGNOSIS — F32.A ANXIETY AND DEPRESSION: Primary | ICD-10-CM

## 2025-06-03 DIAGNOSIS — F41.9 ANXIETY AND DEPRESSION: Primary | ICD-10-CM

## 2025-06-18 ENCOUNTER — TELEPHONE (OUTPATIENT)
Age: 35
End: 2025-06-18

## 2025-06-18 NOTE — TELEPHONE ENCOUNTER
Contacted patient in regards to ROUTINE Referral, LVM to contact [phone] to discuss services needed at this time in order to be added to proper wait list.    Please add patient to proper WL(s).

## 2025-06-24 ENCOUNTER — TELEPHONE (OUTPATIENT)
Age: 35
End: 2025-06-24

## 2025-06-24 NOTE — TELEPHONE ENCOUNTER
Patient has been added to the [facility] wait list with a referral.    Insurance: [employer] Plus  Insurance Type:    Commercial [x]   Medicaid []   [facility] (if applicable) [place] [facility] []  Location Preference: [place]  Provider Preference: No preference  Virtual: Yes [x] No []  Were outside resources sent: Yes [] No [x]    Writer encouraged patient to check with insurance company to see if there are any local places that might have sooner availability.

## 2025-07-01 DIAGNOSIS — I10 ESSENTIAL HYPERTENSION: ICD-10-CM

## 2025-07-01 RX ORDER — LOSARTAN POTASSIUM 50 MG/1
50 TABLET ORAL DAILY
Qty: 30 TABLET | Refills: 0 | Status: SHIPPED | OUTPATIENT
Start: 2025-07-01

## 2025-08-17 DIAGNOSIS — I10 ESSENTIAL HYPERTENSION: ICD-10-CM

## 2025-08-18 DIAGNOSIS — I10 ESSENTIAL HYPERTENSION: ICD-10-CM

## 2025-08-18 RX ORDER — LOSARTAN POTASSIUM 50 MG/1
50 TABLET ORAL DAILY
Qty: 90 TABLET | Refills: 1 | Status: SHIPPED | OUTPATIENT
Start: 2025-08-18

## 2025-08-18 RX ORDER — LOSARTAN POTASSIUM 50 MG/1
50 TABLET ORAL DAILY
Qty: 30 TABLET | Refills: 0 | OUTPATIENT
Start: 2025-08-18